# Patient Record
Sex: MALE | Race: WHITE | Employment: FULL TIME | URBAN - METROPOLITAN AREA
[De-identification: names, ages, dates, MRNs, and addresses within clinical notes are randomized per-mention and may not be internally consistent; named-entity substitution may affect disease eponyms.]

---

## 2022-02-13 ENCOUNTER — APPOINTMENT (OUTPATIENT)
Dept: GENERAL RADIOLOGY | Age: 57
DRG: 280 | End: 2022-02-13
Attending: EMERGENCY MEDICINE
Payer: COMMERCIAL

## 2022-02-13 ENCOUNTER — APPOINTMENT (OUTPATIENT)
Dept: CT IMAGING | Age: 57
DRG: 280 | End: 2022-02-13
Attending: EMERGENCY MEDICINE
Payer: COMMERCIAL

## 2022-02-13 ENCOUNTER — HOSPITAL ENCOUNTER (INPATIENT)
Age: 57
LOS: 5 days | Discharge: HOME | DRG: 280 | End: 2022-02-18
Attending: EMERGENCY MEDICINE | Admitting: HOSPITALIST
Payer: COMMERCIAL

## 2022-02-13 DIAGNOSIS — R09.02 HYPOXIA: ICD-10-CM

## 2022-02-13 DIAGNOSIS — I21.4 NON-ST ELEVATED MYOCARDIAL INFARCTION (HCC): Primary | ICD-10-CM

## 2022-02-13 DIAGNOSIS — I21.4 NSTEMI (NON-ST ELEVATED MYOCARDIAL INFARCTION) (HCC): ICD-10-CM

## 2022-02-13 PROBLEM — J90 PLEURAL EFFUSION: Status: ACTIVE | Noted: 2022-02-13

## 2022-02-13 PROBLEM — R07.9 CHEST PAIN: Status: ACTIVE | Noted: 2022-02-13

## 2022-02-13 LAB
ALBUMIN SERPL-MCNC: 3.4 G/DL (ref 3.4–5)
ALBUMIN/GLOB SERPL: 1.1 {RATIO} (ref 0.8–1.7)
ALP SERPL-CCNC: 131 U/L (ref 45–117)
ALT SERPL-CCNC: 157 U/L (ref 16–61)
ANION GAP SERPL CALC-SCNC: 8 MMOL/L (ref 3–18)
APTT PPP: 27.8 SEC (ref 23–36.4)
APTT PPP: 30.8 SEC (ref 23–36.4)
AST SERPL-CCNC: 65 U/L (ref 10–38)
ATRIAL RATE: 100 BPM
ATRIAL RATE: 82 BPM
BASOPHILS # BLD: 0.1 K/UL (ref 0–0.1)
BASOPHILS NFR BLD: 1 % (ref 0–2)
BILIRUB SERPL-MCNC: 0.4 MG/DL (ref 0.2–1)
BNP SERPL-MCNC: 2813 PG/ML (ref 0–900)
BUN SERPL-MCNC: 18 MG/DL (ref 7–18)
BUN/CREAT SERPL: 17 (ref 12–20)
CALCIUM SERPL-MCNC: 9.1 MG/DL (ref 8.5–10.1)
CALCULATED P AXIS, ECG09: 60 DEGREES
CALCULATED P AXIS, ECG09: 68 DEGREES
CALCULATED R AXIS, ECG10: -2 DEGREES
CALCULATED R AXIS, ECG10: 46 DEGREES
CALCULATED T AXIS, ECG11: -143 DEGREES
CALCULATED T AXIS, ECG11: 119 DEGREES
CHLORIDE SERPL-SCNC: 105 MMOL/L (ref 100–111)
CO2 SERPL-SCNC: 27 MMOL/L (ref 21–32)
COVID-19 RAPID TEST, COVR: DETECTED
COVID-19 RAPID TEST, COVR: NOT DETECTED
CREAT SERPL-MCNC: 1.08 MG/DL (ref 0.6–1.3)
D DIMER PPP FEU-MCNC: 0.75 UG/ML(FEU)
DIAGNOSIS, 93000: NORMAL
DIAGNOSIS, 93000: NORMAL
DIFFERENTIAL METHOD BLD: ABNORMAL
EOSINOPHIL # BLD: 0.1 K/UL (ref 0–0.4)
EOSINOPHIL NFR BLD: 1 % (ref 0–5)
ERYTHROCYTE [DISTWIDTH] IN BLOOD BY AUTOMATED COUNT: 14.4 % (ref 11.6–14.5)
GLOBULIN SER CALC-MCNC: 3.1 G/DL (ref 2–4)
GLUCOSE BLD STRIP.AUTO-MCNC: 180 MG/DL (ref 70–110)
GLUCOSE BLD STRIP.AUTO-MCNC: 308 MG/DL (ref 70–110)
GLUCOSE BLD STRIP.AUTO-MCNC: 93 MG/DL (ref 70–110)
GLUCOSE SERPL-MCNC: 387 MG/DL (ref 74–99)
HCT VFR BLD AUTO: 47.1 % (ref 36–48)
HGB BLD-MCNC: 14.9 G/DL (ref 13–16)
IMM GRANULOCYTES # BLD AUTO: 0.1 K/UL (ref 0–0.04)
IMM GRANULOCYTES NFR BLD AUTO: 1 % (ref 0–0.5)
INR PPP: 1 (ref 0.8–1.2)
LACTATE SERPL-SCNC: 1.2 MMOL/L (ref 0.4–2)
LACTATE SERPL-SCNC: 2.9 MMOL/L (ref 0.4–2)
LYMPHOCYTES # BLD: 1.6 K/UL (ref 0.9–3.6)
LYMPHOCYTES NFR BLD: 13 % (ref 21–52)
MCH RBC QN AUTO: 27.8 PG (ref 24–34)
MCHC RBC AUTO-ENTMCNC: 31.6 G/DL (ref 31–37)
MCV RBC AUTO: 87.9 FL (ref 78–100)
MONOCYTES # BLD: 0.8 K/UL (ref 0.05–1.2)
MONOCYTES NFR BLD: 7 % (ref 3–10)
NEUTS SEG # BLD: 9.4 K/UL (ref 1.8–8)
NEUTS SEG NFR BLD: 78 % (ref 40–73)
NRBC # BLD: 0 K/UL (ref 0–0.01)
NRBC BLD-RTO: 0 PER 100 WBC
P-R INTERVAL, ECG05: 134 MS
P-R INTERVAL, ECG05: 156 MS
PLATELET # BLD AUTO: 477 K/UL (ref 135–420)
PMV BLD AUTO: 10.5 FL (ref 9.2–11.8)
POTASSIUM SERPL-SCNC: 4.4 MMOL/L (ref 3.5–5.5)
PROT SERPL-MCNC: 6.5 G/DL (ref 6.4–8.2)
PROTHROMBIN TIME: 12.6 SEC (ref 11.5–15.2)
Q-T INTERVAL, ECG07: 338 MS
Q-T INTERVAL, ECG07: 386 MS
QRS DURATION, ECG06: 94 MS
QRS DURATION, ECG06: 98 MS
QTC CALCULATION (BEZET), ECG08: 436 MS
QTC CALCULATION (BEZET), ECG08: 450 MS
RBC # BLD AUTO: 5.36 M/UL (ref 4.35–5.65)
SODIUM SERPL-SCNC: 140 MMOL/L (ref 136–145)
SOURCE, COVRS: ABNORMAL
SOURCE, COVRS: NORMAL
TROPONIN-HIGH SENSITIVITY: 1396 NG/L (ref 0–78)
TROPONIN-HIGH SENSITIVITY: 1851 NG/L (ref 0–78)
TROPONIN-HIGH SENSITIVITY: 972 NG/L (ref 0–78)
VENTRICULAR RATE, ECG03: 100 BPM
VENTRICULAR RATE, ECG03: 82 BPM
WBC # BLD AUTO: 12 K/UL (ref 4.6–13.2)

## 2022-02-13 PROCEDURE — 74011250637 HC RX REV CODE- 250/637: Performed by: INTERNAL MEDICINE

## 2022-02-13 PROCEDURE — 85025 COMPLETE CBC W/AUTO DIFF WBC: CPT

## 2022-02-13 PROCEDURE — 99285 EMERGENCY DEPT VISIT HI MDM: CPT

## 2022-02-13 PROCEDURE — 80053 COMPREHEN METABOLIC PANEL: CPT

## 2022-02-13 PROCEDURE — 36415 COLL VENOUS BLD VENIPUNCTURE: CPT

## 2022-02-13 PROCEDURE — 96374 THER/PROPH/DIAG INJ IV PUSH: CPT

## 2022-02-13 PROCEDURE — 82962 GLUCOSE BLOOD TEST: CPT

## 2022-02-13 PROCEDURE — 74011250636 HC RX REV CODE- 250/636: Performed by: HOSPITALIST

## 2022-02-13 PROCEDURE — 85730 THROMBOPLASTIN TIME PARTIAL: CPT

## 2022-02-13 PROCEDURE — 84484 ASSAY OF TROPONIN QUANT: CPT

## 2022-02-13 PROCEDURE — 71275 CT ANGIOGRAPHY CHEST: CPT

## 2022-02-13 PROCEDURE — 83605 ASSAY OF LACTIC ACID: CPT

## 2022-02-13 PROCEDURE — 65660000000 HC RM CCU STEPDOWN

## 2022-02-13 PROCEDURE — 87040 BLOOD CULTURE FOR BACTERIA: CPT

## 2022-02-13 PROCEDURE — 74011250636 HC RX REV CODE- 250/636: Performed by: EMERGENCY MEDICINE

## 2022-02-13 PROCEDURE — 87635 SARS-COV-2 COVID-19 AMP PRB: CPT

## 2022-02-13 PROCEDURE — 71045 X-RAY EXAM CHEST 1 VIEW: CPT

## 2022-02-13 PROCEDURE — 74011000636 HC RX REV CODE- 636: Performed by: EMERGENCY MEDICINE

## 2022-02-13 PROCEDURE — 85610 PROTHROMBIN TIME: CPT

## 2022-02-13 PROCEDURE — 74011636637 HC RX REV CODE- 636/637: Performed by: HOSPITALIST

## 2022-02-13 PROCEDURE — 93005 ELECTROCARDIOGRAM TRACING: CPT

## 2022-02-13 PROCEDURE — 74011250637 HC RX REV CODE- 250/637: Performed by: EMERGENCY MEDICINE

## 2022-02-13 PROCEDURE — 85379 FIBRIN DEGRADATION QUANT: CPT

## 2022-02-13 PROCEDURE — 83880 ASSAY OF NATRIURETIC PEPTIDE: CPT

## 2022-02-13 RX ORDER — HEPARIN SODIUM 1000 [USP'U]/ML
4000 INJECTION, SOLUTION INTRAVENOUS; SUBCUTANEOUS ONCE
Status: COMPLETED | OUTPATIENT
Start: 2022-02-13 | End: 2022-02-13

## 2022-02-13 RX ORDER — INSULIN LISPRO 100 [IU]/ML
INJECTION, SOLUTION INTRAVENOUS; SUBCUTANEOUS
Status: DISCONTINUED | OUTPATIENT
Start: 2022-02-13 | End: 2022-02-13

## 2022-02-13 RX ORDER — HEPARIN SODIUM 5000 [USP'U]/ML
3000 INJECTION, SOLUTION INTRAVENOUS; SUBCUTANEOUS ONCE
Status: DISCONTINUED | OUTPATIENT
Start: 2022-02-13 | End: 2022-02-13

## 2022-02-13 RX ORDER — MAGNESIUM SULFATE 100 %
16 CRYSTALS MISCELLANEOUS AS NEEDED
Status: DISCONTINUED | OUTPATIENT
Start: 2022-02-13 | End: 2022-02-18 | Stop reason: HOSPADM

## 2022-02-13 RX ORDER — ATORVASTATIN CALCIUM 20 MG/1
40 TABLET, FILM COATED ORAL
Status: DISCONTINUED | OUTPATIENT
Start: 2022-02-13 | End: 2022-02-18 | Stop reason: HOSPADM

## 2022-02-13 RX ORDER — FUROSEMIDE 10 MG/ML
40 INJECTION INTRAMUSCULAR; INTRAVENOUS DAILY
Status: DISCONTINUED | OUTPATIENT
Start: 2022-02-13 | End: 2022-02-18

## 2022-02-13 RX ORDER — HEPARIN SODIUM 1000 [USP'U]/ML
3000 INJECTION, SOLUTION INTRAVENOUS; SUBCUTANEOUS ONCE
Status: COMPLETED | OUTPATIENT
Start: 2022-02-13 | End: 2022-02-13

## 2022-02-13 RX ORDER — INSULIN LISPRO 100 [IU]/ML
INJECTION, SOLUTION INTRAVENOUS; SUBCUTANEOUS
Status: DISCONTINUED | OUTPATIENT
Start: 2022-02-13 | End: 2022-02-18 | Stop reason: HOSPADM

## 2022-02-13 RX ORDER — ASPIRIN 81 MG/1
81 TABLET ORAL DAILY
Status: DISCONTINUED | OUTPATIENT
Start: 2022-02-14 | End: 2022-02-18 | Stop reason: HOSPADM

## 2022-02-13 RX ORDER — GUAIFENESIN 100 MG/5ML
162 LIQUID (ML) ORAL
Status: COMPLETED | OUTPATIENT
Start: 2022-02-13 | End: 2022-02-13

## 2022-02-13 RX ORDER — HEPARIN SODIUM 10000 [USP'U]/100ML
12-25 INJECTION, SOLUTION INTRAVENOUS
Status: DISCONTINUED | OUTPATIENT
Start: 2022-02-13 | End: 2022-02-13 | Stop reason: RX

## 2022-02-13 RX ORDER — INSULIN GLARGINE 100 [IU]/ML
20 INJECTION, SOLUTION SUBCUTANEOUS
Status: DISCONTINUED | OUTPATIENT
Start: 2022-02-13 | End: 2022-02-14

## 2022-02-13 RX ADMIN — FUROSEMIDE 40 MG: 10 INJECTION, SOLUTION INTRAMUSCULAR; INTRAVENOUS at 18:00

## 2022-02-13 RX ADMIN — INSULIN GLARGINE 20 UNITS: 100 INJECTION, SOLUTION SUBCUTANEOUS at 21:50

## 2022-02-13 RX ADMIN — IOPAMIDOL 100 ML: 755 INJECTION, SOLUTION INTRAVENOUS at 10:46

## 2022-02-13 RX ADMIN — HEPARIN SODIUM 4000 UNITS: 1000 INJECTION INTRAVENOUS; SUBCUTANEOUS at 11:30

## 2022-02-13 RX ADMIN — Medication 2 UNITS: at 21:49

## 2022-02-13 RX ADMIN — ASPIRIN 162 MG: 81 TABLET, CHEWABLE ORAL at 09:58

## 2022-02-13 RX ADMIN — HEPARIN SODIUM 3000 UNITS: 1000 INJECTION INTRAVENOUS; SUBCUTANEOUS at 20:41

## 2022-02-13 RX ADMIN — Medication 8 UNITS: at 14:00

## 2022-02-13 RX ADMIN — HEPARIN SODIUM 12 UNITS/KG/HR: 5000 INJECTION INTRAVENOUS; SUBCUTANEOUS at 11:31

## 2022-02-13 RX ADMIN — ATORVASTATIN CALCIUM 40 MG: 20 TABLET, FILM COATED ORAL at 21:51

## 2022-02-13 NOTE — Clinical Note
Sheath #1: Sheath: inserted. Sheath inserted/placed in the left radial  artery. Hemostasis achieved. Upon evaluation of the common femoral artery stick using fluoroscopy, the access site puncture was within the safe zone.

## 2022-02-13 NOTE — ED NOTES
21 R AC PIV established  20 R FA PIV established  Blood collected and sent to lab  EKG completed  Pt provided urinal and placed on 2 L NC for increased SOB

## 2022-02-13 NOTE — ED PROVIDER NOTES
EMERGENCY DEPARTMENT HISTORY AND PHYSICAL EXAM    Date: 2/13/2022  Patient Name: Khoi Gomez    History of Presenting Illness     Chief Complaint   Patient presents with    Shortness of Breath       History Provided By: Patient     History Nay Nath):   8:05 AM  Khoi Gomez is a 49 y.o. male with PMHX of Rom Tay who presents to the emergency department C/O dyspnea onset last night. Associated sxs include chest pain. Pt denies any other sxs or complaints. Pt tested positive for COVID on 31 Jan 2022, he is fully vaccinated and boosted. Chief Complaint: dyspnea  Onset: last night  Timing:  acute  Context: Symptoms started spontaneously, symptoms have rapidly worsened since onset  Location: lungs  Quality: Pressure and Tightness  Severity: Moderate  Modifying Factors: Nothing makes it better, or worse. Associated Symptoms: chest pain    PCP: Jim Ibrahim MD    Past History         Past Medical History:  No past medical history on file. Past Surgical History:  No past surgical history on file. Family History:  No family history on file. Reviewed and non-contributory    Social History:  Social History     Tobacco Use    Smoking status: Not on file    Smokeless tobacco: Not on file   Substance Use Topics    Alcohol use: Not on file    Drug use: Not on file       Allergies: Allergies   Allergen Reactions    Pcn [Penicillins] Unknown (comments)         Review of Systems      Review of Systems   Constitutional: Negative for chills and fever. HENT: Negative for rhinorrhea and sore throat. Eyes: Negative for pain and visual disturbance. Respiratory: Positive for shortness of breath. Negative for chest tightness and wheezing. Cardiovascular: Positive for chest pain. Negative for palpitations. Gastrointestinal: Negative for abdominal pain, diarrhea, nausea and vomiting. Musculoskeletal: Negative for arthralgias and myalgias. Skin: Negative for rash and wound. Neurological: Negative for speech difficulty, light-headedness and headaches. Psychiatric/Behavioral: Negative for agitation and confusion. All other systems reviewed and are negative. Physical Exam     Vitals:    02/13/22 0959 02/13/22 1027 02/13/22 1127 02/13/22 1156   BP: 134/77 132/74 130/79    Pulse: 82 73 76 74   Resp: 27 16 21 22   Temp:       SpO2: 93% 98% 97% 96%   Weight:       Height:           Physical Exam  Vitals and nursing note reviewed. Constitutional:       General: He is not in acute distress. Appearance: Normal appearance. He is normal weight. He is not ill-appearing. HENT:      Head: Normocephalic and atraumatic. Nose: Nose normal. No rhinorrhea. Mouth/Throat:      Mouth: Mucous membranes are moist.      Pharynx: No oropharyngeal exudate or posterior oropharyngeal erythema. Eyes:      Extraocular Movements: Extraocular movements intact. Conjunctiva/sclera: Conjunctivae normal.      Pupils: Pupils are equal, round, and reactive to light. Cardiovascular:      Rate and Rhythm: Regular rhythm. Tachycardia present. Heart sounds: No murmur heard. No friction rub. No gallop. Pulmonary:      Effort: Accessory muscle usage and respiratory distress present. Breath sounds: Normal breath sounds. No decreased air movement or transmitted upper airway sounds. No wheezing, rhonchi or rales. Abdominal:      General: Bowel sounds are normal.      Palpations: Abdomen is soft. Tenderness: There is no abdominal tenderness. There is no guarding or rebound. Musculoskeletal:         General: No swelling, tenderness or deformity. Normal range of motion. Cervical back: Normal range of motion and neck supple. No rigidity. Lymphadenopathy:      Cervical: No cervical adenopathy. Skin:     General: Skin is warm and dry. Findings: No rash. Neurological:      General: No focal deficit present.       Mental Status: He is alert and oriented to person, place, and time. Psychiatric:         Mood and Affect: Mood normal.         Behavior: Behavior normal.         Diagnostic Study Results     Labs -     Recent Results (from the past 12 hour(s))   EKG, 12 LEAD, INITIAL    Collection Time: 02/13/22  8:06 AM   Result Value Ref Range    Ventricular Rate 100 BPM    Atrial Rate 100 BPM    P-R Interval 134 ms    QRS Duration 98 ms    Q-T Interval 338 ms    QTC Calculation (Bezet) 436 ms    Calculated P Axis 68 degrees    Calculated R Axis 46 degrees    Calculated T Axis 119 degrees    Diagnosis       Normal sinus rhythm  Possible Left atrial enlargement  Minimal voltage criteria for LVH, may be normal variant ( [employer] product )  Nonspecific T wave abnormality  Abnormal ECG  No previous ECGs available     CBC WITH AUTOMATED DIFF    Collection Time: 02/13/22  8:15 AM   Result Value Ref Range    WBC 12.0 4.6 - 13.2 K/uL    RBC 5.36 4.35 - 5.65 M/uL    HGB 14.9 13.0 - 16.0 g/dL    HCT 47.1 36.0 - 48.0 %    MCV 87.9 78.0 - 100.0 FL    MCH 27.8 24.0 - 34.0 PG    MCHC 31.6 31.0 - 37.0 g/dL    RDW 14.4 11.6 - 14.5 %    PLATELET 832 (H) 875 - 420 K/uL    MPV 10.5 9.2 - 11.8 FL    NRBC 0.0 0  WBC    ABSOLUTE NRBC 0.00 0.00 - 0.01 K/uL    NEUTROPHILS 78 (H) 40 - 73 %    LYMPHOCYTES 13 (L) 21 - 52 %    MONOCYTES 7 3 - 10 %    EOSINOPHILS 1 0 - 5 %    BASOPHILS 1 0 - 2 %    IMMATURE GRANULOCYTES 1 (H) 0.0 - 0.5 %    ABS. NEUTROPHILS 9.4 (H) 1.8 - 8.0 K/UL    ABS. LYMPHOCYTES 1.6 0.9 - 3.6 K/UL    ABS. MONOCYTES 0.8 0.05 - 1.2 K/UL    ABS. EOSINOPHILS 0.1 0.0 - 0.4 K/UL    ABS. BASOPHILS 0.1 0.0 - 0.1 K/UL    ABS. IMM.  GRANS. 0.1 (H) 0.00 - 0.04 K/UL    DF AUTOMATED     METABOLIC PANEL, COMPREHENSIVE    Collection Time: 02/13/22  8:15 AM   Result Value Ref Range    Sodium 140 136 - 145 mmol/L    Potassium 4.4 3.5 - 5.5 mmol/L    Chloride 105 100 - 111 mmol/L    CO2 27 21 - 32 mmol/L    Anion gap 8 3.0 - 18 mmol/L    Glucose 387 (H) 74 - 99 mg/dL    BUN 18 7.0 - 18 MG/DL Creatinine 1.08 0.6 - 1.3 MG/DL    BUN/Creatinine ratio 17 12 - 20      GFR est AA >60 >60 ml/min/1.73m2    GFR est non-AA >60 >60 ml/min/1.73m2    Calcium 9.1 8.5 - 10.1 MG/DL    Bilirubin, total 0.4 0.2 - 1.0 MG/DL    ALT (SGPT) 157 (H) 16 - 61 U/L    AST (SGOT) 65 (H) 10 - 38 U/L    Alk. phosphatase 131 (H) 45 - 117 U/L    Protein, total 6.5 6.4 - 8.2 g/dL    Albumin 3.4 3.4 - 5.0 g/dL    Globulin 3.1 2.0 - 4.0 g/dL    A-G Ratio 1.1 0.8 - 1.7     TROPONIN-HIGH SENSITIVITY    Collection Time: 02/13/22  8:15 AM   Result Value Ref Range    Troponin-High Sensitivity 972 (HH) 0 - 78 ng/L   PROTHROMBIN TIME + INR    Collection Time: 02/13/22  8:15 AM   Result Value Ref Range    Prothrombin time 12.6 11.5 - 15.2 sec    INR 1.0 0.8 - 1.2     D DIMER    Collection Time: 02/13/22  8:15 AM   Result Value Ref Range    D DIMER 0.75 (H) <0.46 ug/ml(FEU)   PTT    Collection Time: 02/13/22  8:15 AM   Result Value Ref Range    aPTT 27.8 23.0 - 36.4 SEC   LACTIC ACID    Collection Time: 02/13/22  8:30 AM   Result Value Ref Range    Lactic acid 2.9 (HH) 0.4 - 2.0 MMOL/L   EKG, 12 LEAD, SUBSEQUENT    Collection Time: 02/13/22  9:56 AM   Result Value Ref Range    Ventricular Rate 82 BPM    Atrial Rate 82 BPM    P-R Interval 156 ms    QRS Duration 94 ms    Q-T Interval 386 ms    QTC Calculation (Bezet) 450 ms    Calculated P Axis 60 degrees    Calculated R Axis -2 degrees    Calculated T Axis -143 degrees    Diagnosis       Normal sinus rhythm  T wave abnormality, consider lateral ischemia  Abnormal ECG  When compared with ECG of 13-FEB-2022 08:06,  No significant change was found     COVID-19 RAPID TEST    Collection Time: 02/13/22 10:25 AM   Result Value Ref Range    Specimen source Nasopharyngeal      COVID-19 rapid test Not detected NOTD         Radiologic Studies -   CTA CHEST W OR W WO CONT   Final Result      1. No pulmonary thromboembolic disease. 2. Moderate to large right and moderate left pleural effusions.  Additional regions of patchy groundglass opacity are also seen throughout both lower lobe   basilar segments and throughout both perihilar upper lobes. Indeterminant to   what extent the regions of groundglass opacity representing alveolitis related   to moderate central airway disease or instead represent an acute atypical   infection such as acute Covid-19 pneumonia. XR CHEST PORT   Final Result      Mild diffusely increased interstitial markings and scattered patchy alveolar   opacities, appearance most suggestive of an acute atypical multifocal pulmonary   infiltrate. CT Results  (Last 48 hours)               02/13/22 1056  CTA CHEST W OR W WO CONT Final result    Impression:      1. No pulmonary thromboembolic disease. 2. Moderate to large right and moderate left pleural effusions. Additional   regions of patchy groundglass opacity are also seen throughout both lower lobe   basilar segments and throughout both perihilar upper lobes. Indeterminant to   what extent the regions of groundglass opacity representing alveolitis related   to moderate central airway disease or instead represent an acute atypical   infection such as acute Covid-19 pneumonia. Narrative:  EXAM: CTA CHEST W OR W WO CONT       CLINICAL INDICATION/HISTORY: Shortness of breath, elevated d-dimer       COMPARISON: Chest radiograph same day       TECHNIQUE: Thin section CT was performed through the chest during pulmonary   arterial enhancement. MIP 3D reconstructions were generated to increase   sensitivity in the detection of pulmonary emboli. One or more dose reduction   techniques were used on this CT: automated exposure control, adjustment of the   mAs and/or kVp according to patient size, and iterative reconstruction   techniques.   The specific techniques used on this CT exam have been documented in the patient's electronic medical record. Digital Imaging and Communications   in Medicine (DICOM) format image data are available to nonaffiliated external   healthcare facilities or entities on a secure, media free, reciprocally   searchable basis with patient authorization for at least a 12-month period after   this study. --- EXAM QUALITY ---       1. Overall exam quality- satisfactory: adequate    2. Adequacy of pulmonary enhancement-optimal: more than enough enhancement to   evaluate subsegmental vessels. Determined by main PA density 250 HU are greater   3. Adequacy of breath hold- adequate: sufficient enough to render diagnosis    4. There are no significant noise, beam hardening, streak artifacts affecting   scan quality. _______________       FINDINGS:       ---  PULMONARY CT ANGIOGRAM  ---       PULMONARY VASCULATURE: The right and left central, primary segmental and   subsegmental pulmonary arteries appear patent. There is no convincing evidence   of intraluminal filling defect identified to suggest pulmonary embolism. THORACIC AORTA: Normal caliber thoracic aorta. No aortic aneurysm, intramural   hematoma or dissection. ---  CT CHEST ---       LUNG PARENCHYMA:   Patchy groundglass opacities are seen throughout medial and   perihilar distributions of both upper lobes. There is also patchy opacity   relatively diffusely throughout the basilar segments of both lower lobes. Moderate central and lower lobe bronchial wall thickening. No bronchiectasis. PLEURAL SPACES:   Moderate to large right and moderate-sized left pleural   effusions. No pneumothorax. MEDIASTINUM:   No thoracic lymphadenopathy. No global cardiomegaly. No   pericardial effusion. OSSEOUS STRUCTURES:   No acute osseous abnormality. Mild thoracic degenerative   disk disease. UPPER ABDOMEN: No acute abnormality.            _______________               CXR Results  (Last 50 hours)               02/13/22 0843  XR CHEST PORT Final result    Impression:      Mild diffusely increased interstitial markings and scattered patchy alveolar   opacities, appearance most suggestive of an acute atypical multifocal pulmonary   infiltrate. Narrative:  EXAM: CHEST RADIOGRAPH, SINGLE VIEW       CLINICAL INDICATION/HISTORY: Dyspnea, shortness of breath       COMPARISON: None. TECHNIQUE: Portable frontal view of the chest was obtained.        _______________       FINDINGS:       SUPPORT DEVICES: None. HEART AND MEDIASTINUM: Cardiomediastinal silhouette appears within normal   limits. Normal caliber thoracic aorta. No central vascular congestion. LUNGS AND PLEURAL SPACES: Mild diffusely increased interstitial markings. Scattered patchy parenchymal opacities are also seen throughout bilateral   perihilar and medial right basilar distributions. No definite pleural effusion. No pneumothorax. BONY THORAX AND SOFT TISSUES: No acute osseous abnormality. _______________                 Medications given in the ED-  Medications   heparin 25,000 units in  ml infusion (12 Units/kg/hr × 79.4 kg IntraVENous New Bag 2/13/22 1131)   aspirin chewable tablet 162 mg (162 mg Oral Given 2/13/22 0958)   iopamidoL (ISOVUE-370) 76 % injection 100 mL (100 mL IntraVENous Given 2/13/22 1046)   heparin (porcine) 1,000 unit/mL injection 4,000 Units (4,000 Units IntraVENous Given 2/13/22 1130)         Procedures     Procedures    ED Course     I am the first provider for this patient. I reviewed the vital signs, available nursing notes, past medical history, past surgical history, family history and social history. Records Reviewed: Nursing Notes    Pulse Oximetry Analysis - 95% on RA     Cardiac Monitor:  Rate: 96 bpm  Rhythm: sinus rhythm    EKG interpretation: (Preliminary)  Rhythm: NSR.  Rate: 100 bpm; no STEMI  EKG read by Constance Beckett MD at 8:06 AM    EKG interpretation: (Repeat)  Rhythm: NSR. Rate: 82 bpm; no STEMI, inverted T V2-3, aVf, V5-6  EKG read by Behzad Coleman MD at 9:56 AM     EKG interpretation: (Repeat)  Rhythm: NSR. Rate: 81 bpm; no STEMI  EKG read by Behzad Coleman MD at 10:12 AM     8:05 AM Initial assessment performed. The patients presenting problems have been discussed, and they are in agreement with the care plan formulated and outlined with them. I have encouraged them to ask questions as they arise throughout their visit. ED Course as of 02/13/22 1202   Sun Feb 13, 2022   1017 Discussed with Dr. Kun Guevara who agrees with plan to CT patient. He also agrees with heparin at this time. [JM]   1200 Discussed with Dr. Mo Dixon for telemetry admission. [JM]      ED Course User Index  [JM] Favian Canales MD           Medical Decision Making     Provider Notes (Medical Decision Making):   DDX: Pneumonia, post Covid, COVID long-haul, PE, ACS, URI, Covid pneumonia    Discussion:  49 y.o. male with recent coronavirus infection. Today he comes in with shortness of breath for 1 day and chest pain. He has recently curtailed the smoking due to his coronavirus infection. Patient states he does not go to the doctor very often. D-dimer and troponin were both elevated. PET scan did not demonstrate any acute PE but showed what appears to be likely sequela of coronavirus infection 2 weeks ago. Patient is requiring some oxygen support. He has been started on heparin for ACS. Discussed with cardiology and hospitalist for telemetry admission. Critical Care Time:   I have spent 62 minutes of critical care time involved in lab review, consultations with specialist, family decision-making, and documentation. During this entire length of time I was immediately available to the patient. Critical Care:   The reason for providing this level of medical care for this critically ill patient was due a critical illness that impaired one or more vital organ systems such that there was a high probability of imminent or life threatening deterioration in the patients condition. This care involved high complexity decision making to assess, manipulate, and support vital system functions, to treat this degreee vital organ system failure and to prevent further life threatening deterioration of the patients condition. Diagnosis and Disposition     Critical Care Time: 62 minutes    Core Measures:  For Hospitalized Patients:    1. Hospitalization Decision Time:  The decision to hospitalize the patient was made by Jami Youssef MD, MD at 9:38 AM on 2/13/2022    2. Aspirin: Aspirin was given    12:00 PM  Patient is being admitted to the hospital by Dr. Jesusita Hall. The results of their tests and reasons for their admission have been discussed with them and/or available family. They convey agreement and understanding for the need to be admitted and for their admission diagnosis. CONDITIONS ON ADMISSION:  Sepsis is not present at the time of admission. Deep Vein Thrombosis is not present at the time of admission. Thrombosis is not present at the time of admission. Urinary Tract Infection is not present at the time of admission. Pneumonia is not present at the time of admission. MRSA is not present at the time of admission. Wound infection is not present at the time of admission. Pressure Ulcer is not present at the time of admission. CLINICAL IMPRESSION:    1. Non-ST elevated myocardial infarction ([address]    2. Hypoxia          Dragon Disclaimer     Please note that this dictation was completed with [employer], the computer voice recognition software. Quite often unanticipated grammatical, syntax, homophones, and other interpretive errors are inadvertently transcribed by the computer software. Please disregard these errors. Please excuse any errors that have escaped final proofreading.     Norberto Schafer MD

## 2022-02-13 NOTE — H&P
History & Physical    Patient: Khris Miller MRN: [id number]  CSN: [id number]    YOB: 1965  Age: 49 y.o. Sex: male      DOA: 2/13/2022  Primary Care Provider:  Vania Freeman MD      Assessment/Plan     Patient Active Problem List   Diagnosis Code    Hypoxia R09.02    Non-STEMI (non-ST elevated myocardial infarction) ([facility] I21.4    DM (diabetes mellitus) ([facility] E11.9    Pleural effusion J90    Chest pain R07.9       Admit to monitored floor    Shortness of breath-  Likely secondary to CHF, pleural effusion  Started on diuresis  Follow-up chest x-ray. CHF-  Follow echo, will check NT proBNP. Pleural effusion-  Likely secondary to CHF  Diuresis as above    NSTEMI-  On heparin drip  Further testing per cardiology. DM-  Uncontrolled  Low-dose Lantus  Sliding-scale insulin  Diabetic diet      Estimated length of stay : 2-3 days    CC: Chest pain, shortness of breath       HPI:     Khris Milelr is a 49 y.o. male with past medical history of diabetes presents to ER with concerns of chest pain and shortness of breath. Patient reports that he was diagnosed with COVID-19 on January 31, 2022. He is vaccinated including booster. He had mild symptoms, he quarantine for 5 days and return to work. He has been feeling better for the past 1 week. He has been doing fine until yesterday. He went out and worked in the yard yesterday morning. Last night when he went to sleep he had some shortness of breath, chest pain and cough. He took some Mucinex that helped however he had to wake up in the middle of the night to sit up. This morning he continued to have chest pain and felt more short of breath when he decided to present to ER. Chest pain located in the center of the chest dull throbbing pain. No radiation. Not associated with nausea or diaphoresis. Long history of smoking. He smokes about a pack a day.   No history of any heart disease in the past.  In ER he was noted to have elevated high-sensitivity troponin. CTA chest with no PE however showed moderate to large right and moderate left pleural effusion. Additional region of patchy groundglass opacity seen throughout both lower lobe basilar segments and throughout both perihilar upper lobes. His EKG showed normal sinus rhythm, T wave abnormality in lateral leads. Past Medical History:   Diagnosis Date    DM (diabetes mellitus) ([address]        No past surgical history on file. No family history on file. Social History     Socioeconomic History    Marital status:        Prior to Admission medications    Not on File       Allergies   Allergen Reactions    Pcn [Penicillins] Unknown (comments)       Review of Systems  Gen: No fever, chills, malaise, weight loss/gain. Heent: No headache, rhinorrhea, epistaxis, ear pain, hearing loss, sinus pain, neck pain/stiffness, sore throat. Heart: see above  Resp: see above. GI: No nausea, vomiting, diarrhea, constipation, melena or hematochezia. : No urinary obstruction, dysuria or hematuria. Derm: No rash, new skin lesion or pruritis. Musc/skeletal: no bone or joint complains. Vasc: No edema, cyanosis or claudication. Endo: No heat/cold intolerance, no polyuria,polydipsia or polyphagia. Neuro: No unilateral weakness, numbness, tingling. No seizures. Heme: No easy bruising or bleeding. Physical Exam:     Physical Exam:  Visit Vitals  /77   Pulse 74   Temp 97.7 °F (36.5 °C)   Resp 20   Ht 5' 8\" (1.727 m)   Wt 79.4 kg (175 lb)   SpO2 99%   BMI 26.61 kg/m²    O2 Flow Rate (L/min): 2 l/min O2 Device: Nasal cannula    Temp (24hrs), Av.8 °F (36.6 °C), Min:97.7 °F (36.5 °C), Max:97.8 °F (36.6 °C)    701 -  1900  In: 240 [P.O.:240]  Out: -    No intake/output data recorded. General:  Awake, cooperative, no distress. Head:  Normocephalic, without obvious abnormality, atraumatic.    Eyes:  Conjunctivae/corneas clear, sclera anicteric, PERRL, EOMs intact. Nose: Nares normal. No drainage or sinus tenderness. Throat: Lips, mucosa, and tongue normal.    Neck: Supple, symmetrical, trachea midline, no adenopathy. Lungs:   Decreased breath sounds lower lungs bilaterally. Heart:   S1, S2, no murmur, click, rub or gallop. Abdomen: Soft, non-tender. Bowel sounds normal. No masses,  No organomegaly. Extremities: Extremities normal, atraumatic, no cyanosis. trace edema. Capillary refill normal.   Pulses: 2+ and symmetric all extremities. Skin: Skin color pink, turgor normal. No rashes or lesions   Neurologic: CNII-XII intact. No focal motor or sensory deficit. Labs Reviewed:    CMP:   Lab Results   Component Value Date/Time     02/13/2022 08:15 AM    K 4.4 02/13/2022 08:15 AM     02/13/2022 08:15 AM    CO2 27 02/13/2022 08:15 AM    AGAP 8 02/13/2022 08:15 AM     (H) 02/13/2022 08:15 AM    BUN 18 02/13/2022 08:15 AM    CREA 1.08 02/13/2022 08:15 AM    GFRAA >60 02/13/2022 08:15 AM    GFRNA >60 02/13/2022 08:15 AM    CA 9.1 02/13/2022 08:15 AM    ALB 3.4 02/13/2022 08:15 AM    TP 6.5 02/13/2022 08:15 AM    GLOB 3.1 02/13/2022 08:15 AM    AGRAT 1.1 02/13/2022 08:15 AM     (H) 02/13/2022 08:15 AM     CBC:   Lab Results   Component Value Date/Time    WBC 12.0 02/13/2022 08:15 AM    HGB 14.9 02/13/2022 08:15 AM    HCT 47.1 02/13/2022 08:15 AM     (H) 02/13/2022 08:15 AM     All Cardiac Markers in the last 24 hours: No results found for: CPK, CK, CKMMB, CKMB, RCK3, CKMBT, CKNDX, CKND1, PETTY, TROPT, TROIQ, CAMRYN, TROPT, TNIPOC, BNP, BNPP      Procedures/imaging: see electronic medical records for all procedures/Xrays and details which were not copied into this note but were reviewed prior to creation of Plan    Please note that this dictation was completed with [employer], the [employer] voice recognition software.   Quite often unanticipated grammatical, syntax, homophones, and other interpretive errors are inadvertently transcribed by the computer software. Please disregard these errors. Please excuse any errors that have escaped final proofreading.         CC: Johanny Ulrich MD

## 2022-02-13 NOTE — Clinical Note
Contrast Dose Calculator:   Patient's age: 49.   Patient's sex: Male. Patient weight (kg) = 79.8. Creatinine level (mg/dL) = 0.65. Creatinine clearance (mL/min): 146.64. Max Contrast dose per Creatinine Cl calculator = 329.94 mL.

## 2022-02-13 NOTE — CONSULTS
[facility]      Patient: Kathy Castellon MRN: [id number]  SSN: [id number]    YOB: 1965  Age: 49 y.o. Sex: male    Date of Consultation: 02/13/2022  Referring Physician: Dorita Massey MD  Reason for Consultation: Chest pain, abnormal troponin     Chief complain:  Chest pain, shortness of breath    HPI:  53-year-old gentleman came to emergency with complaining of chest pain and shortness of breath. He was diagnosed with COVID-19 on January 31, 2022. He had mild symptoms like cough and congestion. He quit in for 5 days and return to work. For last few days he has been feeling leg swelling, shortness of breath chest pain and cough. He is taking Mucinex with some improvement. He is complaining of worsening of shortness of breath and orthopnea. He is complaining of leg swelling. He is complaining of midsternal dull discomfort with movement and with cough. Chest discomfort improves by rest.  Chest pain is non radiating. He denies any dizziness, palpitation, presyncope or syncope. Denies any fever. He is current smoker. He has family history of premature coronary artery disease. Mother had cardiac event in her 46s. Cardiology consult called in view of chest pain, shortness of breath and abnormal troponin    Past Medical History:   Diagnosis Date    DM (diabetes mellitus) ([facility] [address]      No past surgical history on file. Current Facility-Administered Medications   Medication Dose Route Frequency    heparin 25,000 units in  ml infusion  12-25 Units/kg/hr IntraVENous TITRATE    glucagon (GLUCAGEN) injection 1 mg  1 mg IntraMUSCular PRN    glucose chewable tablet 16 g  16 g Oral PRN    insulin lispro (HUMALOG) injection   SubCUTAneous AC&HS    furosemide (LASIX) injection 40 mg  40 mg IntraVENous DAILY    insulin glargine (LANTUS) injection 20 Units  20 Units SubCUTAneous QHS       Allergies and Intolerances:    Allergies   Allergen Reactions    Pcn [Penicillins] Unknown (comments)       Family History:   No family history on file. Social History:   He  has no history on file for tobacco use. He  has no history on file for alcohol use. Review of Systems:     Gen: No fever, chills, malaise, weight loss/gain. Heent: No headache, rhinorrhea, epistaxis, ear pain, hearing loss, sinus pain, neck pain/stiffness, sore throat. Heart:   positive chest pain, shortness of breath, orthopnea, no palpitations,pnd. Resp:  positive cough,  No hemoptysis, wheezing and  Dyspnea  GI: No nausea, vomiting, diarrhea, constipation, melena or hematochezia. : No urinary obstruction, dysuria or hematuria. Derm: No rash, new skin lesion or pruritis. Musc/skeletal: no bone or joint complains. Vasc:  positive edema, no cyanosis or claudication. Endo: No heat/cold intolerance, no polyuria,polydipsia or polyphagia. Neuro: No unilateral weakness, numbness, tingling. No seizures. Heme: No easy bruising or bleeding. Physical:   Patient Vitals for the past 6 hrs:   Temp Pulse Resp BP SpO2   02/13/22 1700 97.7 °F (36.5 °C) 74 20 121/77 99 %   02/13/22 1233  83 23 (!) 141/80 96 %         Exam:   General Appearance: Comfortable, not using accessory muscles of respiration. HEENT: EKATERINA. HEAD: Atraumatic  NECK: No JVD, no thyroidomeglay. CAROTIDS: no bruit  LUNGS:  Absent air entry right and left base, reduced air entry right mid zone, no crepitation   HEART: S1+S2 audible, no murmur, no pericardial rub. ABD: Non-tender, BS Audible    EXT: Bilateral lower extremity + edema, and no cyanosis. VASCULAR EXAM: Pulses are intact. PSYCHIATRIC EXAM: Mood is appropriate. MUSCULOSKELETAL: Grossly no joint deformity.   NEUROLOGICAL: AAO times 3, Motor and sensory sytem intact     Review of Data:   LABS:   Lab Results   Component Value Date/Time    WBC 12.0 02/13/2022 08:15 AM    HGB 14.9 02/13/2022 08:15 AM    HCT 47.1 02/13/2022 08:15 AM    PLATELET 745 (H) 65/25/6229 08:15 AM     Lab Results   Component Value Date/Time    Sodium 140 02/13/2022 08:15 AM    Potassium 4.4 02/13/2022 08:15 AM    Chloride 105 02/13/2022 08:15 AM    CO2 27 02/13/2022 08:15 AM    Glucose 387 (H) 02/13/2022 08:15 AM    BUN 18 02/13/2022 08:15 AM    Creatinine 1.08 02/13/2022 08:15 AM     No results found for: CHOL, CHOLX, CHLST, CHOLV, HDL, HDLP, LDL, LDLC, DLDLP, TGLX, TRIGL, TRIGP  No components found for: GPT  No results found for: HBA1C, TCL8RGPU, MVQ2NRHM      Cardiology Procedures:   Results for orders placed or performed during the hospital encounter of 02/13/22   EKG, 12 LEAD, INITIAL   Result Value Ref Range    Ventricular Rate 100 BPM    Atrial Rate 100 BPM    P-R Interval 134 ms    QRS Duration 98 ms    Q-T Interval 338 ms    QTC Calculation (Bezet) 436 ms    Calculated P Axis 68 degrees    Calculated R Axis 46 degrees    Calculated T Axis 119 degrees    Diagnosis       Normal sinus rhythm   Poor R Wave Progression   T wave abnormality, consider lateral ischemia   Abnormal ECG               Impression / Plan:    Patient Active Problem List   Diagnosis Code    Hypoxia R09.02    Non-STEMI (non-ST elevated myocardial infarction) ([facility]) I21.4    DM (diabetes mellitus) ([facility]) E11.9    Pleural effusion J90    Chest pain R07.9       CT chest reported    1. No pulmonary thromboembolic disease. 2. Moderate to large right and moderate left pleural effusions. Additional regions of patchy groundglass opacity are also seen throughout both lower lobe basilar segments and throughout both perihilar upper lobes. Indeterminant to what extent the regions of groundglass opacity representing alveolitis related to moderate central airway disease or instead represent an acute atypical  infection such as acute Covid-19 pneumonia. Start aspirin 81 mg by mouth once a day and atorvastatin 40 mg by mouth at bedtime. Start IV Lasix  She will EKG with cardiac enzymes Q 6-8 hours x3. IV heparin drip.     Will add beta-blocker if blood pressure permits. Echocardiogram.     Plan discussed with patient and family member at bedside and they verbally understood and agreed.     Continue telemetry monitor  Further cardiac workup as clinically indicated       Signed By: Iwona Cantrell MD     February 13, 2022

## 2022-02-13 NOTE — ED TRIAGE NOTES
Patient ambulatory to ED COVID + with c/o SOB that started last night and chest pain.      Tripod position after ambulation from triage to ED bed    Able to speak in full sentences, visible WOB

## 2022-02-13 NOTE — Clinical Note
History and physical documented and up to date, allergies reviewed, lab results reviewed, pre-procedure education provided, patient verbalized understanding of procedure, procedural consent signed and patient is NPO. 18

## 2022-02-13 NOTE — Clinical Note
TRANSFER - IN REPORT:     Verbal report received from: EDGAR RN. Report consisted of patient's Situation, Background, Assessment and   Recommendations(SBAR). Opportunity for questions and clarification was provided. Assessment completed upon patient's arrival to unit and care assumed. Patient transported with a Registered Nurse.

## 2022-02-14 ENCOUNTER — APPOINTMENT (OUTPATIENT)
Dept: GENERAL RADIOLOGY | Age: 57
DRG: 280 | End: 2022-02-14
Attending: HOSPITALIST
Payer: COMMERCIAL

## 2022-02-14 ENCOUNTER — APPOINTMENT (OUTPATIENT)
Dept: NON INVASIVE DIAGNOSTICS | Age: 57
DRG: 280 | End: 2022-02-14
Attending: INTERNAL MEDICINE
Payer: COMMERCIAL

## 2022-02-14 ENCOUNTER — APPOINTMENT (OUTPATIENT)
Dept: GENERAL RADIOLOGY | Age: 57
DRG: 280 | End: 2022-02-14
Attending: INTERNAL MEDICINE
Payer: COMMERCIAL

## 2022-02-14 LAB
ALBUMIN SERPL-MCNC: 2.8 G/DL (ref 3.4–5)
ALBUMIN/GLOB SERPL: 0.9 {RATIO} (ref 0.8–1.7)
ALP SERPL-CCNC: 95 U/L (ref 45–117)
ALT SERPL-CCNC: 145 U/L (ref 16–61)
ANION GAP SERPL CALC-SCNC: 7 MMOL/L (ref 3–18)
APTT PPP: 32.3 SEC (ref 23–36.4)
APTT PPP: 40.4 SEC (ref 23–36.4)
APTT PPP: 57.4 SEC (ref 23–36.4)
AST SERPL-CCNC: 72 U/L (ref 10–38)
ATRIAL RATE: 81 BPM
BASOPHILS # BLD: 0.1 K/UL (ref 0–0.1)
BASOPHILS NFR BLD: 1 % (ref 0–2)
BILIRUB SERPL-MCNC: 0.3 MG/DL (ref 0.2–1)
BUN SERPL-MCNC: 16 MG/DL (ref 7–18)
BUN/CREAT SERPL: 21 (ref 12–20)
CALCIUM SERPL-MCNC: 8.7 MG/DL (ref 8.5–10.1)
CALCULATED P AXIS, ECG09: 65 DEGREES
CALCULATED R AXIS, ECG10: -25 DEGREES
CALCULATED T AXIS, ECG11: 155 DEGREES
CHLORIDE SERPL-SCNC: 109 MMOL/L (ref 100–111)
CO2 SERPL-SCNC: 28 MMOL/L (ref 21–32)
CREAT SERPL-MCNC: 0.75 MG/DL (ref 0.6–1.3)
DIAGNOSIS, 93000: NORMAL
DIFFERENTIAL METHOD BLD: NORMAL
ECHO AO ROOT DIAM: 3.3 CM
ECHO AO ROOT INDEX: 1.71 CM/M2
ECHO AV AREA PEAK VELOCITY: 1.4 CM2
ECHO AV AREA PEAK VELOCITY: 1.4 CM2
ECHO AV AREA VTI: 1.2 CM2
ECHO AV AREA VTI: 1.2 CM2
ECHO AV MEAN GRADIENT: 5 MMHG
ECHO AV MEAN VELOCITY: 1.1 M/S
ECHO AV PEAK GRADIENT: 9 MMHG
ECHO AV PEAK VELOCITY: 1.5 M/S
ECHO AV VELOCITY RATIO: 0.47
ECHO AV VTI: 32.2 CM
ECHO EST RA PRESSURE: 3 MMHG
ECHO LA DIAMETER INDEX: 2.33 CM/M2
ECHO LA DIAMETER: 4.5 CM
ECHO LA TO AORTIC ROOT RATIO: 1.36
ECHO LA VOL 4C: 53 ML (ref 18–58)
ECHO LA VOLUME INDEX A4C: 27 ML/M2 (ref 16–34)
ECHO LV E' LATERAL VELOCITY: 6 CM/S
ECHO LV E' SEPTAL VELOCITY: 5 CM/S
ECHO LV EDV A2C: 119 ML
ECHO LV EDV A4C: 77 ML
ECHO LV EDV BP: 97 ML (ref 67–155)
ECHO LV EDV INDEX A4C: 40 ML/M2
ECHO LV EDV INDEX BP: 50 ML/M2
ECHO LV EDV NDEX A2C: 62 ML/M2
ECHO LV EJECTION FRACTION A2C: 41 %
ECHO LV EJECTION FRACTION A4C: 45 %
ECHO LV EJECTION FRACTION BIPLANE: 43 % (ref 55–100)
ECHO LV ESV A2C: 71 ML
ECHO LV ESV A4C: 42 ML
ECHO LV ESV BP: 55 ML (ref 22–58)
ECHO LV ESV INDEX A2C: 37 ML/M2
ECHO LV ESV INDEX A4C: 22 ML/M2
ECHO LV ESV INDEX BP: 28 ML/M2
ECHO LV FRACTIONAL SHORTENING: 25 % (ref 28–44)
ECHO LV GLOBAL LONGITUDINAL STRAIN (GLS): -8.1 %
ECHO LV INTERNAL DIMENSION DIASTOLE INDEX: 2.85 CM/M2
ECHO LV INTERNAL DIMENSION DIASTOLIC: 5.5 CM (ref 4.2–5.9)
ECHO LV INTERNAL DIMENSION SYSTOLIC INDEX: 2.12 CM/M2
ECHO LV INTERNAL DIMENSION SYSTOLIC: 4.1 CM
ECHO LV IVSD: 1.1 CM (ref 0.6–1)
ECHO LV MASS 2D: 242 G (ref 88–224)
ECHO LV MASS INDEX 2D: 125.4 G/M2 (ref 49–115)
ECHO LV POSTERIOR WALL DIASTOLIC: 1.1 CM (ref 0.6–1)
ECHO LV RELATIVE WALL THICKNESS RATIO: 0.4
ECHO LVOT AREA: 3.1 CM2
ECHO LVOT AV VTI INDEX: 0.4
ECHO LVOT DIAM: 2 CM
ECHO LVOT MEAN GRADIENT: 1 MMHG
ECHO LVOT PEAK GRADIENT: 2 MMHG
ECHO LVOT PEAK VELOCITY: 0.7 M/S
ECHO LVOT STROKE VOLUME INDEX: 21.2 ML/M2
ECHO LVOT SV: 40.8 ML
ECHO LVOT VTI: 13 CM
ECHO MV A VELOCITY: 0.78 M/S
ECHO MV E DECELERATION TIME (DT): 132.8 MS
ECHO MV E VELOCITY: 1.06 M/S
ECHO MV E/A RATIO: 1.36
ECHO MV E/E' LATERAL: 17.67
ECHO MV E/E' RATIO (AVERAGED): 19.43
ECHO MV E/E' SEPTAL: 21.2
ECHO RIGHT VENTRICULAR SYSTOLIC PRESSURE (RVSP): 44 MMHG
ECHO RV FREE WALL PEAK S': 17 CM/S
ECHO RV INTERNAL DIMENSION: 3.3 CM
ECHO RV TAPSE: 1.7 CM (ref 1.5–2)
ECHO TV REGURGITANT MAX VELOCITY: 3.22 M/S
ECHO TV REGURGITANT PEAK GRADIENT: 41 MMHG
EOSINOPHIL # BLD: 0.1 K/UL (ref 0–0.4)
EOSINOPHIL NFR BLD: 1 % (ref 0–5)
ERYTHROCYTE [DISTWIDTH] IN BLOOD BY AUTOMATED COUNT: 14.3 % (ref 11.6–14.5)
EST. AVERAGE GLUCOSE BLD GHB EST-MCNC: 280 MG/DL
GLOBULIN SER CALC-MCNC: 3 G/DL (ref 2–4)
GLUCOSE BLD STRIP.AUTO-MCNC: 101 MG/DL (ref 70–110)
GLUCOSE BLD STRIP.AUTO-MCNC: 132 MG/DL (ref 70–110)
GLUCOSE BLD STRIP.AUTO-MCNC: 256 MG/DL (ref 70–110)
GLUCOSE BLD STRIP.AUTO-MCNC: 291 MG/DL (ref 70–110)
GLUCOSE SERPL-MCNC: 101 MG/DL (ref 74–99)
HBA1C MFR BLD: 11.4 % (ref 4.2–5.6)
HCT VFR BLD AUTO: 41.1 % (ref 36–48)
HGB BLD-MCNC: 13.4 G/DL (ref 13–16)
IMM GRANULOCYTES # BLD AUTO: 0 K/UL (ref 0–0.04)
IMM GRANULOCYTES NFR BLD AUTO: 0 % (ref 0–0.5)
LYMPHOCYTES # BLD: 2.4 K/UL (ref 0.9–3.6)
LYMPHOCYTES NFR BLD: 23 % (ref 21–52)
MCH RBC QN AUTO: 28.1 PG (ref 24–34)
MCHC RBC AUTO-ENTMCNC: 32.6 G/DL (ref 31–37)
MCV RBC AUTO: 86.2 FL (ref 78–100)
MONOCYTES # BLD: 0.8 K/UL (ref 0.05–1.2)
MONOCYTES NFR BLD: 8 % (ref 3–10)
NEUTS SEG # BLD: 6.9 K/UL (ref 1.8–8)
NEUTS SEG NFR BLD: 67 % (ref 40–73)
NRBC # BLD: 0 K/UL (ref 0–0.01)
NRBC BLD-RTO: 0 PER 100 WBC
P-R INTERVAL, ECG05: 160 MS
PLATELET # BLD AUTO: 406 K/UL (ref 135–420)
PMV BLD AUTO: 10.6 FL (ref 9.2–11.8)
POTASSIUM SERPL-SCNC: 3.3 MMOL/L (ref 3.5–5.5)
PROT SERPL-MCNC: 5.8 G/DL (ref 6.4–8.2)
Q-T INTERVAL, ECG07: 398 MS
QRS DURATION, ECG06: 94 MS
QTC CALCULATION (BEZET), ECG08: 462 MS
RBC # BLD AUTO: 4.77 M/UL (ref 4.35–5.65)
SODIUM SERPL-SCNC: 144 MMOL/L (ref 136–145)
VENTRICULAR RATE, ECG03: 81 BPM
WBC # BLD AUTO: 10.3 K/UL (ref 4.6–13.2)

## 2022-02-14 PROCEDURE — 85730 THROMBOPLASTIN TIME PARTIAL: CPT

## 2022-02-14 PROCEDURE — 82962 GLUCOSE BLOOD TEST: CPT

## 2022-02-14 PROCEDURE — 65660000000 HC RM CCU STEPDOWN

## 2022-02-14 PROCEDURE — 71045 X-RAY EXAM CHEST 1 VIEW: CPT

## 2022-02-14 PROCEDURE — 74011250637 HC RX REV CODE- 250/637: Performed by: HOSPITALIST

## 2022-02-14 PROCEDURE — 36415 COLL VENOUS BLD VENIPUNCTURE: CPT

## 2022-02-14 PROCEDURE — C8929 TTE W OR WO FOL WCON,DOPPLER: HCPCS

## 2022-02-14 PROCEDURE — 74011250636 HC RX REV CODE- 250/636: Performed by: EMERGENCY MEDICINE

## 2022-02-14 PROCEDURE — 74011250636 HC RX REV CODE- 250/636: Performed by: HOSPITALIST

## 2022-02-14 PROCEDURE — 83036 HEMOGLOBIN GLYCOSYLATED A1C: CPT

## 2022-02-14 PROCEDURE — 85025 COMPLETE CBC W/AUTO DIFF WBC: CPT

## 2022-02-14 PROCEDURE — 74011250636 HC RX REV CODE- 250/636

## 2022-02-14 PROCEDURE — 74011636637 HC RX REV CODE- 636/637: Performed by: HOSPITALIST

## 2022-02-14 PROCEDURE — 77010033678 HC OXYGEN DAILY

## 2022-02-14 PROCEDURE — 74011000250 HC RX REV CODE- 250: Performed by: HOSPITALIST

## 2022-02-14 PROCEDURE — 80053 COMPREHEN METABOLIC PANEL: CPT

## 2022-02-14 PROCEDURE — 74011250637 HC RX REV CODE- 250/637: Performed by: INTERNAL MEDICINE

## 2022-02-14 RX ORDER — HEPARIN SODIUM 1000 [USP'U]/ML
3000 INJECTION, SOLUTION INTRAVENOUS; SUBCUTANEOUS ONCE
Status: COMPLETED | OUTPATIENT
Start: 2022-02-14 | End: 2022-02-14

## 2022-02-14 RX ORDER — BUPROPION HYDROCHLORIDE 100 MG/1
100 TABLET, EXTENDED RELEASE ORAL 2 TIMES DAILY
Status: DISCONTINUED | OUTPATIENT
Start: 2022-02-14 | End: 2022-02-18 | Stop reason: HOSPADM

## 2022-02-14 RX ORDER — HEPARIN SODIUM 5000 [USP'U]/ML
3000 INJECTION, SOLUTION INTRAVENOUS; SUBCUTANEOUS ONCE
Status: DISCONTINUED | OUTPATIENT
Start: 2022-02-14 | End: 2022-02-14

## 2022-02-14 RX ORDER — GLIPIZIDE 10 MG/1
5 TABLET, FILM COATED, EXTENDED RELEASE ORAL 2 TIMES DAILY
COMMUNITY

## 2022-02-14 RX ORDER — SITAGLIPTIN AND METFORMIN HYDROCHLORIDE 50; 1000 MG/1; MG/1
1 TABLET, FILM COATED ORAL 2 TIMES DAILY WITH MEALS
COMMUNITY

## 2022-02-14 RX ORDER — HEPARIN SODIUM 1000 [USP'U]/ML
40 INJECTION, SOLUTION INTRAVENOUS; SUBCUTANEOUS ONCE
Status: COMPLETED | OUTPATIENT
Start: 2022-02-14 | End: 2022-02-14

## 2022-02-14 RX ORDER — ATORVASTATIN CALCIUM 80 MG/1
80 TABLET, FILM COATED ORAL DAILY
COMMUNITY

## 2022-02-14 RX ORDER — ASPIRIN 81 MG/1
81 TABLET ORAL DAILY
COMMUNITY

## 2022-02-14 RX ORDER — POTASSIUM CHLORIDE 20 MEQ/1
40 TABLET, EXTENDED RELEASE ORAL
Status: COMPLETED | OUTPATIENT
Start: 2022-02-14 | End: 2022-02-14

## 2022-02-14 RX ORDER — HEPARIN SODIUM 1000 [USP'U]/ML
INJECTION, SOLUTION INTRAVENOUS; SUBCUTANEOUS
Status: COMPLETED
Start: 2022-02-14 | End: 2022-02-14

## 2022-02-14 RX ORDER — INSULIN GLARGINE 100 [IU]/ML
50 INJECTION, SOLUTION SUBCUTANEOUS
COMMUNITY

## 2022-02-14 RX ORDER — BUPROPION HYDROCHLORIDE 150 MG/1
TABLET, EXTENDED RELEASE ORAL 2 TIMES DAILY
COMMUNITY

## 2022-02-14 RX ORDER — INSULIN GLARGINE 100 [IU]/ML
25 INJECTION, SOLUTION SUBCUTANEOUS
Status: DISCONTINUED | OUTPATIENT
Start: 2022-02-14 | End: 2022-02-15

## 2022-02-14 RX ORDER — LISINOPRIL 40 MG/1
40 TABLET ORAL DAILY
COMMUNITY
End: 2022-02-18

## 2022-02-14 RX ADMIN — INSULIN GLARGINE 25 UNITS: 100 INJECTION, SOLUTION SUBCUTANEOUS at 21:59

## 2022-02-14 RX ADMIN — SODIUM CHLORIDE, PRESERVATIVE FREE 1 ML: 5 INJECTION INTRAVENOUS at 10:24

## 2022-02-14 RX ADMIN — BUPROPION HYDROCHLORIDE 100 MG: 100 TABLET, EXTENDED RELEASE ORAL at 21:59

## 2022-02-14 RX ADMIN — HEPARIN SODIUM 3000 UNITS: 1000 INJECTION, SOLUTION INTRAVENOUS; SUBCUTANEOUS at 13:07

## 2022-02-14 RX ADMIN — FUROSEMIDE 40 MG: 10 INJECTION, SOLUTION INTRAMUSCULAR; INTRAVENOUS at 09:34

## 2022-02-14 RX ADMIN — HEPARIN SODIUM 3000 UNITS: 1000 INJECTION INTRAVENOUS; SUBCUTANEOUS at 13:07

## 2022-02-14 RX ADMIN — ATORVASTATIN CALCIUM 40 MG: 20 TABLET, FILM COATED ORAL at 21:59

## 2022-02-14 RX ADMIN — Medication 6 UNITS: at 22:00

## 2022-02-14 RX ADMIN — POTASSIUM CHLORIDE 40 MEQ: 20 TABLET, EXTENDED RELEASE ORAL at 12:44

## 2022-02-14 RX ADMIN — HEPARIN SODIUM 3180 UNITS: 1000 INJECTION INTRAVENOUS; SUBCUTANEOUS at 05:07

## 2022-02-14 RX ADMIN — HEPARIN SODIUM 18 UNITS/KG/HR: 5000 INJECTION INTRAVENOUS; SUBCUTANEOUS at 12:58

## 2022-02-14 RX ADMIN — Medication 6 UNITS: at 11:30

## 2022-02-14 NOTE — DIABETES MGMT
Diabetes/ Glycemic Control Plan of Care  Recommendations:   Recommend meal time insulin for postprandial hyperglycemia     Assessment: Patient with a history of diabetes currently under isolation for COVID infection. POC glucose ranging  mg/dl over the last 24 hours. Fasting blood glucose this morning WNL of 101 mg/dl. Patient with post-prandial glucose excursion today of 291 mg/dl. DX:   1. Non-ST elevated myocardial infarction ([address]     2. Hypoxia          Recent Glucose Results:   Lab Results   Component Value Date/Time     (H) 02/14/2022 02:15 AM    GLUCPOC 291 (H) 02/14/2022 12:02 PM    GLUCPOC 101 02/14/2022 06:15 AM    GLUCPOC 180 (H) 02/13/2022 09:47 PM         Within target range (70-180mg/dL): No  Current insulin orders: 25 units Lantus, Humalog ACHS  Total Daily Dose previous 24 hours = 36 units (20 units Lantus + 16 units Humalog)  Current A1c: No results found for: HBA1C, FWT1AONH, FQR5TYEB     Nutrition/Diet:   Active Orders   Diet    ADULT DIET Regular; 4 carb choices (60 gm/meal)     Home diabetes medications:   Key Antihyperglycemic Medications             glipiZIDE SR (GLUCOTROL XL) 10 mg CR tablet (Taking) Take 5 mg by mouth two (2) times a day. insulin glargine (Lantus U-100 Insulin) 100 unit/mL injection (Taking) 50 Units by SubCUTAneous route nightly. Plan/Goals:   Blood glucose will be within target of 70 - 180 mg/dl within 72 hours  Reinforce dietary and medication compliance at home.          Education:  [] Refer to Diabetes Education Record                       [] Education not indicated at this time     Carlton Alvarez RD

## 2022-02-14 NOTE — PROGRESS NOTES
Reason for Admission:  C/o SOB, CP                     RUR Score:     4                Plan for utilizing home health:     TBD     PCP: First and Last name:  Izabella Clay MD     Name of Practice:    Are you a current patient: Yes/No:    Approximate date of last visit:    Can you participate in a virtual visit with your PCP:                     Current Advanced Directive/Advance Care Plan: Full Code      Healthcare Decision Maker:   Click here to complete [address] including selection of the Healthcare Decision Maker Relationship (ie \"Primary\")                             Transition of Care Plan:   Chart reviewed, pt arrived to ED with c/o SOB, reports tested positive for covid 1-31-22, fully vaccinated. Hx includes DM, pt admitted for further medical management  Of NSTEMI,DM. Cm will cont to review and remain available for d/c planning.   Care Management Interventions  Palliative Care Criteria Met (RRAT>21 & CHF Dx)?: No  Support Systems: Spouse/Significant Other  Confirm Follow Up Transport: Family  Discharge Location  Patient Expects to be Discharged to[de-identified] Home

## 2022-02-14 NOTE — PROGRESS NOTES
Problem: Falls - Risk of  Goal: *Absence of Falls  Description: Document Izzy Spencer Fall Risk and appropriate interventions in the flowsheet. Outcome: Progressing Towards Goal  Note: Fall Risk Interventions:  Mobility Interventions: Assess mobility with egress test,Communicate number of staff needed for ambulation/transfer,OT consult for ADLs,Patient to call before getting OOB,PT Consult for mobility concerns,PT Consult for assist device competence,Strengthening exercises (ROM-active/passive),Utilize walker, cane, or other assistive device         Medication Interventions: Bed/chair exit alarm,Evaluate medications/consider consulting pharmacy,Patient to call before getting OOB,Teach patient to arise slowly                   Problem: Patient Education: Go to Patient Education Activity  Goal: Patient/Family Education  Outcome: Progressing Towards Goal     Problem: Airway Clearance - Ineffective  Goal: Achieve or maintain patent airway  Outcome: Progressing Towards Goal     Problem: Gas Exchange - Impaired  Goal: Absence of hypoxia  Outcome: Progressing Towards Goal  Goal: Promote optimal lung function  Outcome: Progressing Towards Goal     Problem: Breathing Pattern - Ineffective  Goal: Ability to achieve and maintain a regular respiratory rate  Outcome: Progressing Towards Goal     Problem:  Body Temperature -  Risk of, Imbalanced  Goal: Ability to maintain a body temperature within defined limits  Outcome: Progressing Towards Goal  Goal: Will regain or maintain usual level of consciousness  Outcome: Progressing Towards Goal  Goal: Complications related to the disease process, condition or treatment will be avoided or minimized  Outcome: Progressing Towards Goal     Problem: Isolation Precautions - Risk of Spread of Infection  Goal: Prevent transmission of infectious organism to others  Outcome: Progressing Towards Goal     Problem: Nutrition Deficits  Goal: Optimize nutrtional status  Outcome: Progressing Towards Goal     Problem: Risk for Fluid Volume Deficit  Goal: Maintain normal heart rhythm  Outcome: Progressing Towards Goal  Goal: Maintain absence of muscle cramping  Outcome: Progressing Towards Goal  Goal: Maintain normal serum potassium, sodium, calcium, phosphorus, and pH  Outcome: Progressing Towards Goal     Problem: Loneliness or Risk for Loneliness  Goal: Demonstrate positive use of time alone when socialization is not possible  Outcome: Progressing Towards Goal     Problem: Fatigue  Goal: Verbalize increase energy and improved vitality  Outcome: Progressing Towards Goal     Problem: Patient Education: Go to Patient Education Activity  Goal: Patient/Family Education  Outcome: Progressing Towards Goal

## 2022-02-15 PROBLEM — I50.23 SYSTOLIC CHF, ACUTE ON CHRONIC (HCC): Status: ACTIVE | Noted: 2022-02-15

## 2022-02-15 PROBLEM — U07.1 COVID-19: Status: ACTIVE | Noted: 2022-02-15

## 2022-02-15 LAB
ALBUMIN SERPL-MCNC: 2.6 G/DL (ref 3.4–5)
ALBUMIN/GLOB SERPL: 0.9 {RATIO} (ref 0.8–1.7)
ALP SERPL-CCNC: 96 U/L (ref 45–117)
ALT SERPL-CCNC: 111 U/L (ref 16–61)
ANION GAP SERPL CALC-SCNC: 6 MMOL/L (ref 3–18)
APTT PPP: 157 SEC (ref 23–36.4)
APTT PPP: 28.4 SEC (ref 23–36.4)
APTT PPP: 54.6 SEC (ref 23–36.4)
APTT PPP: 55.1 SEC (ref 23–36.4)
AST SERPL-CCNC: 33 U/L (ref 10–38)
BASOPHILS # BLD: 0 K/UL (ref 0–0.1)
BASOPHILS NFR BLD: 1 % (ref 0–2)
BILIRUB SERPL-MCNC: 0.4 MG/DL (ref 0.2–1)
BUN SERPL-MCNC: 18 MG/DL (ref 7–18)
BUN/CREAT SERPL: 20 (ref 12–20)
CALCIUM SERPL-MCNC: 8.8 MG/DL (ref 8.5–10.1)
CHLORIDE SERPL-SCNC: 106 MMOL/L (ref 100–111)
CO2 SERPL-SCNC: 30 MMOL/L (ref 21–32)
CREAT SERPL-MCNC: 0.92 MG/DL (ref 0.6–1.3)
DIFFERENTIAL METHOD BLD: ABNORMAL
EOSINOPHIL # BLD: 0.1 K/UL (ref 0–0.4)
EOSINOPHIL NFR BLD: 1 % (ref 0–5)
ERYTHROCYTE [DISTWIDTH] IN BLOOD BY AUTOMATED COUNT: 14.1 % (ref 11.6–14.5)
GLOBULIN SER CALC-MCNC: 2.8 G/DL (ref 2–4)
GLUCOSE BLD STRIP.AUTO-MCNC: 117 MG/DL (ref 70–110)
GLUCOSE BLD STRIP.AUTO-MCNC: 126 MG/DL (ref 70–110)
GLUCOSE BLD STRIP.AUTO-MCNC: 224 MG/DL (ref 70–110)
GLUCOSE BLD STRIP.AUTO-MCNC: 83 MG/DL (ref 70–110)
GLUCOSE SERPL-MCNC: 85 MG/DL (ref 74–99)
HCT VFR BLD AUTO: 41.1 % (ref 36–48)
HGB BLD-MCNC: 13.3 G/DL (ref 13–16)
IMM GRANULOCYTES # BLD AUTO: 0 K/UL (ref 0–0.04)
IMM GRANULOCYTES NFR BLD AUTO: 0 % (ref 0–0.5)
LYMPHOCYTES # BLD: 2 K/UL (ref 0.9–3.6)
LYMPHOCYTES NFR BLD: 24 % (ref 21–52)
MCH RBC QN AUTO: 28.2 PG (ref 24–34)
MCHC RBC AUTO-ENTMCNC: 32.4 G/DL (ref 31–37)
MCV RBC AUTO: 87.3 FL (ref 78–100)
MONOCYTES # BLD: 0.9 K/UL (ref 0.05–1.2)
MONOCYTES NFR BLD: 11 % (ref 3–10)
NEUTS SEG # BLD: 5.2 K/UL (ref 1.8–8)
NEUTS SEG NFR BLD: 64 % (ref 40–73)
NRBC # BLD: 0 K/UL (ref 0–0.01)
NRBC BLD-RTO: 0 PER 100 WBC
PLATELET # BLD AUTO: 398 K/UL (ref 135–420)
PMV BLD AUTO: 10.2 FL (ref 9.2–11.8)
POTASSIUM SERPL-SCNC: 4.1 MMOL/L (ref 3.5–5.5)
PROT SERPL-MCNC: 5.4 G/DL (ref 6.4–8.2)
RBC # BLD AUTO: 4.71 M/UL (ref 4.35–5.65)
SARS-COV-2, COV2: NORMAL
SODIUM SERPL-SCNC: 142 MMOL/L (ref 136–145)
TROPONIN-HIGH SENSITIVITY: 1023 NG/L (ref 0–78)
TROPONIN-HIGH SENSITIVITY: 1213 NG/L (ref 0–78)
WBC # BLD AUTO: 8.1 K/UL (ref 4.6–13.2)

## 2022-02-15 PROCEDURE — 84484 ASSAY OF TROPONIN QUANT: CPT

## 2022-02-15 PROCEDURE — 74011636637 HC RX REV CODE- 636/637: Performed by: HOSPITALIST

## 2022-02-15 PROCEDURE — 85730 THROMBOPLASTIN TIME PARTIAL: CPT

## 2022-02-15 PROCEDURE — U0003 INFECTIOUS AGENT DETECTION BY NUCLEIC ACID (DNA OR RNA); SEVERE ACUTE RESPIRATORY SYNDROME CORONAVIRUS 2 (SARS-COV-2) (CORONAVIRUS DISEASE [COVID-19]), AMPLIFIED PROBE TECHNIQUE, MAKING USE OF HIGH THROUGHPUT TECHNOLOGIES AS DESCRIBED BY CMS-2020-01-R: HCPCS

## 2022-02-15 PROCEDURE — 82962 GLUCOSE BLOOD TEST: CPT

## 2022-02-15 PROCEDURE — 80053 COMPREHEN METABOLIC PANEL: CPT

## 2022-02-15 PROCEDURE — 74011250637 HC RX REV CODE- 250/637: Performed by: INTERNAL MEDICINE

## 2022-02-15 PROCEDURE — 77010033678 HC OXYGEN DAILY

## 2022-02-15 PROCEDURE — 74011250636 HC RX REV CODE- 250/636: Performed by: HOSPITALIST

## 2022-02-15 PROCEDURE — 85025 COMPLETE CBC W/AUTO DIFF WBC: CPT

## 2022-02-15 PROCEDURE — 65660000000 HC RM CCU STEPDOWN

## 2022-02-15 PROCEDURE — 74011250636 HC RX REV CODE- 250/636: Performed by: EMERGENCY MEDICINE

## 2022-02-15 PROCEDURE — 36415 COLL VENOUS BLD VENIPUNCTURE: CPT

## 2022-02-15 PROCEDURE — 74011250637 HC RX REV CODE- 250/637: Performed by: HOSPITALIST

## 2022-02-15 RX ORDER — HEPARIN SODIUM 5000 [USP'U]/ML
3000 INJECTION, SOLUTION INTRAVENOUS; SUBCUTANEOUS ONCE
Status: COMPLETED | OUTPATIENT
Start: 2022-02-15 | End: 2022-02-15

## 2022-02-15 RX ORDER — LOSARTAN POTASSIUM 25 MG/1
25 TABLET ORAL DAILY
Status: DISCONTINUED | OUTPATIENT
Start: 2022-02-16 | End: 2022-02-16

## 2022-02-15 RX ORDER — ZINC SULFATE 50(220)MG
1 CAPSULE ORAL DAILY
Status: DISCONTINUED | OUTPATIENT
Start: 2022-02-16 | End: 2022-02-18 | Stop reason: HOSPADM

## 2022-02-15 RX ORDER — MELATONIN
2000 DAILY
Status: DISCONTINUED | OUTPATIENT
Start: 2022-02-15 | End: 2022-02-15

## 2022-02-15 RX ORDER — ASCORBIC ACID 250 MG
500 TABLET ORAL DAILY
Status: DISCONTINUED | OUTPATIENT
Start: 2022-02-16 | End: 2022-02-18 | Stop reason: HOSPADM

## 2022-02-15 RX ORDER — MELATONIN
2000 DAILY
Status: DISCONTINUED | OUTPATIENT
Start: 2022-02-16 | End: 2022-02-18 | Stop reason: HOSPADM

## 2022-02-15 RX ORDER — HEPARIN SODIUM 1000 [USP'U]/ML
3000 INJECTION, SOLUTION INTRAVENOUS; SUBCUTANEOUS ONCE
Status: COMPLETED | OUTPATIENT
Start: 2022-02-15 | End: 2022-02-15

## 2022-02-15 RX ORDER — INSULIN GLARGINE 100 [IU]/ML
20 INJECTION, SOLUTION SUBCUTANEOUS
Status: DISCONTINUED | OUTPATIENT
Start: 2022-02-15 | End: 2022-02-16

## 2022-02-15 RX ORDER — CALCIUM CARB/MAGNESIUM CARB 311-232MG
5 TABLET ORAL
Status: DISCONTINUED | OUTPATIENT
Start: 2022-02-15 | End: 2022-02-18 | Stop reason: HOSPADM

## 2022-02-15 RX ORDER — IBUPROFEN 200 MG
1 TABLET ORAL DAILY
Status: DISCONTINUED | OUTPATIENT
Start: 2022-02-16 | End: 2022-02-18 | Stop reason: HOSPADM

## 2022-02-15 RX ORDER — HEPARIN SODIUM 1000 [USP'U]/ML
5000 INJECTION, SOLUTION INTRAVENOUS; SUBCUTANEOUS ONCE
Status: DISCONTINUED | OUTPATIENT
Start: 2022-02-15 | End: 2022-02-15

## 2022-02-15 RX ORDER — INSULIN LISPRO 100 [IU]/ML
3 INJECTION, SOLUTION INTRAVENOUS; SUBCUTANEOUS
Status: DISCONTINUED | OUTPATIENT
Start: 2022-02-15 | End: 2022-02-18 | Stop reason: HOSPADM

## 2022-02-15 RX ORDER — METOPROLOL TARTRATE 25 MG/1
12.5 TABLET, FILM COATED ORAL EVERY 12 HOURS
Status: DISCONTINUED | OUTPATIENT
Start: 2022-02-15 | End: 2022-02-18 | Stop reason: HOSPADM

## 2022-02-15 RX ADMIN — HEPARIN SODIUM 3000 UNITS: 5000 INJECTION, SOLUTION INTRAVENOUS; SUBCUTANEOUS at 17:16

## 2022-02-15 RX ADMIN — Medication 5 MG: at 22:32

## 2022-02-15 RX ADMIN — HEPARIN SODIUM 22 UNITS/KG/HR: 5000 INJECTION INTRAVENOUS; SUBCUTANEOUS at 12:01

## 2022-02-15 RX ADMIN — BUPROPION HYDROCHLORIDE 100 MG: 100 TABLET, EXTENDED RELEASE ORAL at 22:32

## 2022-02-15 RX ADMIN — BUPROPION HYDROCHLORIDE 100 MG: 100 TABLET, EXTENDED RELEASE ORAL at 08:06

## 2022-02-15 RX ADMIN — Medication 3 UNITS: at 17:01

## 2022-02-15 RX ADMIN — FUROSEMIDE 40 MG: 10 INJECTION, SOLUTION INTRAMUSCULAR; INTRAVENOUS at 08:06

## 2022-02-15 RX ADMIN — METOPROLOL TARTRATE 12.5 MG: 25 TABLET, FILM COATED ORAL at 22:32

## 2022-02-15 RX ADMIN — HEPARIN SODIUM 3000 UNITS: 1000 INJECTION INTRAVENOUS; SUBCUTANEOUS at 03:35

## 2022-02-15 RX ADMIN — Medication 2000 UNITS: at 23:22

## 2022-02-15 RX ADMIN — INSULIN GLARGINE 20 UNITS: 100 INJECTION, SOLUTION SUBCUTANEOUS at 22:32

## 2022-02-15 RX ADMIN — METOPROLOL TARTRATE 12.5 MG: 25 TABLET, FILM COATED ORAL at 15:08

## 2022-02-15 RX ADMIN — ATORVASTATIN CALCIUM 40 MG: 20 TABLET, FILM COATED ORAL at 22:32

## 2022-02-15 RX ADMIN — ASPIRIN 81 MG: 81 TABLET, COATED ORAL at 08:06

## 2022-02-15 RX ADMIN — Medication 4 UNITS: at 12:35

## 2022-02-15 NOTE — PROGRESS NOTES
DC Plan: home with MD follow up     Care manager rounded in droplet precautions PPE for positive covid; patient appeared dissatisfied with quite a few missed opportunities for chances to explain patient status and treatment in patient's opinion. Care manager spent 30 minutes listening to patient and offering explanations and asking for patient feedback as appropriate to information being provided. Patient appears very concerned about his covid diagnosis and potential risk to his wife who is immunocompromised with cancer; appears fixated on wanting an additional covid test although patient was diagnosed 1/31 with covid and was symptomatic- he and his wife have both had two doses and have been boosted. Care manager will continue to follow patient for discharge needs.     Care Management Interventions  Palliative Care Criteria Met (RRAT>21 & CHF Dx)?: No  Support Systems: Spouse/Significant Other  Confirm Follow Up Transport: Family  Discharge Location  Patient Expects to be Discharged to[de-identified] Home

## 2022-02-15 NOTE — DIABETES MGMT
Diabetes Patient/Family Education Record    Factors That May Influence Patients Ability to Learn or Comply with Recommendations   []   Language barrier    []   Cultural needs   []   Motivation    []   Cognitive limitation    []   Physical   []   Education    []   Physiological factors   []   Hearing/vision/speaking impairment   []   Taoist beliefs    []   Financial factors   [x]  Other:   []  No factors identified at this time. Person Instructed:   [x]   Patient   []   Family   []  Other     Preference for Learning:   []   Verbal   []   Written   []  Demonstration     Level of Comprehension & Competence:    [x]  Good                                      [] Fair                                     []  Poor                             []  Needs Reinforcement   []  Teach back completed    Education Component: Patient reports occasionally missing some of his oral diabetes medications especially lately due to falling asleep earlier due to illness and medications. Patient rarely monitors his blood sugar at home but his PCP wants him to check it 3 times per day. Patent works at [employer]  and he has limited space and time to monitor his blood sugars. Patient unaware of his last HgbA1c though believes this admission of 11.4% is higher than previous draws. Patient aware of carbohydrate containing foods though does not limit these in his diet. His wife is the primary cook of the home and is currently battling cancer and so patient does not feel he has a say in his meals.      []  Medication management, including how to administer insulin (if appropriate) and potential medication interactions    []  Nutritional management - [x] Obtained usual meal pattern   [x]   Basic carbohydrate counting  []  Plate method  []  Limit concentrated sweets and avoid sweetened beverages  []  Portion control  []    Avoid skipping meals   []  Exercise   []  Signs, symptoms, and treatment of hyperglycemia and hypoglycemia   [x] Prevention, recognition and treatment of hyperglycemia and hypoglycemia   [x]  Importance of blood glucose monitoring  [] Blood Glucose targets   []   Provided patient with blood glucose meter  [x]  Has glucometer and supplies at home   []  Instruction on use of the blood glucose meter and recommended monitoring schedule   [x]  Discuss the importance of HbA1C monitoring. Patients A1c is 11.4 %.  This is equivalent to average glucose of 280 mg/dl for the past 2-3 months.   []  Sick day guidelines   []  Proper use and disposal of lancets, needles, syringes or insulin pens (if appropriate)   []  Potential long-term complications (retinopathy, kidney disease, neuropathy, foot care)   [] Information about whom to contact in case of emergency or for more information    []  Goal:  Patient/family will demonstrate understanding of Diabetes Self- Management Skills by: (date) __2/22_____  Plan for post-discharge education or self-management support:    [] Outpatient class schedule provided            [] Patient Declined    [] Scheduled for outpatient classes (date) _______    [] Written information provided  Verify: [x] Prior to admission Diabetes medications    Does patient understand how diabetes medications work? ____________________________  Does patient have difficulty obtaining diabetes medications or testing supplies? _________________     Donna Lopez RD  Glycemic Control Team  [phone]    Monday-Friday   9 am - 3 pm

## 2022-02-15 NOTE — PROGRESS NOTES
Called by nurse as patient had questions  I called  into  His room  Answered questions  To the best of my ability with chart review  Covid 1/31  CHF ef 35 percent  On 2 liters of oxygen  DM and smoking as cardiac risk factors

## 2022-02-15 NOTE — DIABETES MGMT
Diabetes/ Glycemic Control Plan of Care  Recommendations:   Recommend decreasing Lantus to 20 units q 24 hrs. Recommend to initiate 3 units Humalog qAC to prevent postprandial hyperglycemia   Discussed recommendations with hospitalist    Assessment: Patient with history of diabetes, POC glucose ranging  mg/dl with notable excursions postprandially. Fasting blood glucose today of 83 mg/dl on current regimen. Recent Glucose Results:   Lab Results   Component Value Date/Time    GLU 85 02/15/2022 02:00 AM    GLUCPOC 224 (H) 02/15/2022 12:01 PM    GLUCPOC 83 02/15/2022 05:19 AM    GLUCPOC 256 (H) 02/14/2022 09:24 PM        Within target range (70-180mg/dL): No  Current insulin orders: 25 units Lantus, corrective Humalog ACHS  Total Daily Dose previous 24 hours =  37 units (25 units Lantus + 12 units Humalog)     Plan/Goals:   Blood glucose will be within target of 70 - 180 mg/dl within 72 hours  Reinforce dietary and medication compliance at home.          Education:  [x] Refer to Diabetes Education Record                       [] Education not indicated at this time     Kaia Oates RD  Glycemic Control Team  [phone]    Monday-Friday   9 am - 3 pm

## 2022-02-15 NOTE — PROGRESS NOTES
Hospitalist Progress Note    Patient: Viviana Levine MRN: [id number]  CSN: [id number]    YOB: 1965  Age: 49 y.o. Sex: male    DOA: 2/13/2022 LOS:  LOS: 1 day                Assessment/Plan     Patient Active Problem List   Diagnosis Code    Hypoxia R09.02    [facility] I21.4    DM [facility]diabetes mellitus) [facility] E11.9    Pleural effusion J90    Chest pain R07.9        Chief complaint :  Chest pain, SOB    No chest pain, feeling better. Shortness of breath-  Likely secondary to CHF, pleural effusion  Started on diuresis  Follow-up chest x-ray.     CHF-  Follow echo, will check NT proBNP.     Pleural effusion-  Likely secondary to CHF  Diuresis as above     NSTEMI-  On heparin drip  Further testing per cardiology.     DM-  Uncontrolled  Low-dose Lantus  Sliding-scale insulin  Diabetic diet. COVID 19    Disposition : 2-3 days    Review of systems  General: No fevers or chills. Cardiovascular: No chest pain or pressure. No palpitations. Pulmonary: No shortness of breath. Gastrointestinal: No nausea, vomiting. Physical Exam:  General: Awake, cooperative, no acute distress    HEENT: NC, Atraumatic. PERRLA, anicteric sclerae. Lungs: Decreased breath sounds lower lungs bilaterally. Heart:  S1 S2,  No murmur, No Rubs, No Gallops  Abdomen: Soft, Non distended, Non tender.  +Bowel sounds,   Extremities: No c/c/e  Psych:   Not anxious or agitated. Neurologic:  No acute neurological deficit. Vital signs/Intake and Output:  Visit Vitals  /65   Pulse 69   Temp 97.3 °F (36.3 °C)   Resp 18   Ht 5' 8\" (1.727 m)   Wt 79.4 kg (175 lb)   SpO2 99%   BMI 26.61 kg/m²     Current Shift:  No intake/output data recorded.   Last three shifts:  02/13 0701 - 02/14 1900  In: 1020 [P.O.:1020]  Out: 600 [Urine:600]            Labs: Results:       Chemistry Recent Labs     02/14/22  0215 02/13/22  0815   * 387*    140   K 3.3* 4.4  105   CO2 28 27   BUN 16 18   CREA 0.75 1.08   CA 8.7 9.1   AGAP 7 8   BUCR 21* 17   AP 95 131*   TP 5.8* 6.5   ALB 2.8* 3.4   GLOB 3.0 3.1   AGRAT 0.9 1.1      CBC w/Diff Recent Labs     02/14/22  0215 02/13/22  0815   WBC 10.3 12.0   RBC 4.77 5.36   HGB 13.4 14.9   HCT 41.1 47.1    477*   GRANS 67 78*   LYMPH 23 13*   EOS 1 1      Cardiac Enzymes No results for input(s): CPK, CKND1, PETTY in the last 72 hours. No lab exists for component: CKRMB, TROIP   Coagulation Recent Labs     02/14/22  1923 02/14/22  1200 02/13/22  1755 02/13/22  0815   PTP  --   --   --  12.6   INR  --   --   --  1.0   APTT 57.4* 32.3   < > 27.8    < > = values in this interval not displayed. Lipid Panel No results found for: CHOL, CHOLPOCT, CHOLX, CHLST, CHOLV, 006066, HDL, HDLP, LDL, LDLC, DLDLP, 439596, VLDLC, VLDL, TGLX, TRIGL, TRIGP, TGLPOCT, CHHD, CHHDX   BNP No results for input(s): BNPP in the last 72 hours.    Liver Enzymes Recent Labs     02/14/22  0215   TP 5.8*   ALB 2.8*   AP 95      Thyroid Studies No results found for: T4, T3U, TSH, TSHEXT     Procedures/imaging: see electronic medical records for all procedures/Xrays and details which were not copied into this note but were reviewed prior to creation of Plan

## 2022-02-15 NOTE — PROGRESS NOTES
conducted an initial consultation and Spiritual Assessment for Adelaida Carey, who is a 49 y.o.,male. Patient's Primary Language is: [place]. According to the patient's EMR Caodaism Affiliation is: No Scientologist. The reason the Patient came to the hospital is:   Patient Active Problem List    Diagnosis Date Noted    Hypoxia 02/13/2022    [facility] 02/13/2022    Pleural effusion 02/13/2022    Chest pain 02/13/2022    DM (diabetes mellitus) ([facility]         The  provided the following Interventions:  Initiated a relationship of care and support. Explored issues of radha, belief, spirituality and Mosque/ritual needs while hospitalized. Listened empathically as patient expressed in detail his emotional frustration. Offered prayer and assurance of continued prayers on patient's behalf. The following outcomes where achieved:  Patient shared limited information about both their medical narrative and spiritual journey/beliefs.  confirmed Patient's Caodaism Affiliation. Patient processed feeling about current hospitalization. Patient expressed gratitude for 's visit. Assessment:  Patient does not have any Mosque/cultural needs that will affect patient's preferences in health care. There are no spiritual or Mosque issues which require intervention at this time. Plan:  Chaplains will continue to follow and will provide pastoral care on an as needed/requested basis.  recommends bedside caregivers page  on duty if patient shows signs of acute spiritual or emotional distress.     [address]

## 2022-02-16 LAB
ALBUMIN SERPL-MCNC: 2.8 G/DL (ref 3.4–5)
ALBUMIN/GLOB SERPL: 0.8 {RATIO} (ref 0.8–1.7)
ALP SERPL-CCNC: 93 U/L (ref 45–117)
ALT SERPL-CCNC: 85 U/L (ref 16–61)
ANION GAP SERPL CALC-SCNC: 7 MMOL/L (ref 3–18)
APTT PPP: 107.6 SEC (ref 23–36.4)
AST SERPL-CCNC: 25 U/L (ref 10–38)
BILIRUB SERPL-MCNC: 0.3 MG/DL (ref 0.2–1)
BUN SERPL-MCNC: 16 MG/DL (ref 7–18)
BUN/CREAT SERPL: 22 (ref 12–20)
CALCIUM SERPL-MCNC: 8.7 MG/DL (ref 8.5–10.1)
CHLORIDE SERPL-SCNC: 108 MMOL/L (ref 100–111)
CO2 SERPL-SCNC: 26 MMOL/L (ref 21–32)
CREAT SERPL-MCNC: 0.72 MG/DL (ref 0.6–1.3)
ERYTHROCYTE [DISTWIDTH] IN BLOOD BY AUTOMATED COUNT: 14.2 % (ref 11.6–14.5)
GLOBULIN SER CALC-MCNC: 3.6 G/DL (ref 2–4)
GLUCOSE BLD STRIP.AUTO-MCNC: 141 MG/DL (ref 70–110)
GLUCOSE BLD STRIP.AUTO-MCNC: 185 MG/DL (ref 70–110)
GLUCOSE BLD STRIP.AUTO-MCNC: 205 MG/DL (ref 70–110)
GLUCOSE BLD STRIP.AUTO-MCNC: 54 MG/DL (ref 70–110)
GLUCOSE BLD STRIP.AUTO-MCNC: 55 MG/DL (ref 70–110)
GLUCOSE SERPL-MCNC: 51 MG/DL (ref 74–99)
HCT VFR BLD AUTO: 43 % (ref 36–48)
HGB BLD-MCNC: 14.1 G/DL (ref 13–16)
MCH RBC QN AUTO: 28.8 PG (ref 24–34)
MCHC RBC AUTO-ENTMCNC: 32.8 G/DL (ref 31–37)
MCV RBC AUTO: 87.8 FL (ref 78–100)
NRBC # BLD: 0 K/UL (ref 0–0.01)
NRBC BLD-RTO: 0 PER 100 WBC
PLATELET # BLD AUTO: 404 K/UL (ref 135–420)
PMV BLD AUTO: 10.5 FL (ref 9.2–11.8)
POTASSIUM SERPL-SCNC: 3.5 MMOL/L (ref 3.5–5.5)
PROT SERPL-MCNC: 6.4 G/DL (ref 6.4–8.2)
RBC # BLD AUTO: 4.9 M/UL (ref 4.35–5.65)
SARS-COV-2, NAA: DETECTED
SODIUM SERPL-SCNC: 141 MMOL/L (ref 136–145)
WBC # BLD AUTO: 8.7 K/UL (ref 4.6–13.2)

## 2022-02-16 PROCEDURE — 80053 COMPREHEN METABOLIC PANEL: CPT

## 2022-02-16 PROCEDURE — 82962 GLUCOSE BLOOD TEST: CPT

## 2022-02-16 PROCEDURE — 74011250636 HC RX REV CODE- 250/636: Performed by: EMERGENCY MEDICINE

## 2022-02-16 PROCEDURE — 74011250636 HC RX REV CODE- 250/636: Performed by: HOSPITALIST

## 2022-02-16 PROCEDURE — 74011636637 HC RX REV CODE- 636/637: Performed by: HOSPITALIST

## 2022-02-16 PROCEDURE — 77010033678 HC OXYGEN DAILY

## 2022-02-16 PROCEDURE — 36415 COLL VENOUS BLD VENIPUNCTURE: CPT

## 2022-02-16 PROCEDURE — 85730 THROMBOPLASTIN TIME PARTIAL: CPT

## 2022-02-16 PROCEDURE — 74011250637 HC RX REV CODE- 250/637: Performed by: HOSPITALIST

## 2022-02-16 PROCEDURE — 65660000000 HC RM CCU STEPDOWN

## 2022-02-16 PROCEDURE — 85027 COMPLETE CBC AUTOMATED: CPT

## 2022-02-16 PROCEDURE — 74011250637 HC RX REV CODE- 250/637: Performed by: INTERNAL MEDICINE

## 2022-02-16 RX ORDER — LOSARTAN POTASSIUM 25 MG/1
12.5 TABLET ORAL DAILY
Status: DISCONTINUED | OUTPATIENT
Start: 2022-02-17 | End: 2022-02-18 | Stop reason: HOSPADM

## 2022-02-16 RX ORDER — INSULIN GLARGINE 100 [IU]/ML
10 INJECTION, SOLUTION SUBCUTANEOUS
Status: DISCONTINUED | OUTPATIENT
Start: 2022-02-16 | End: 2022-02-18 | Stop reason: HOSPADM

## 2022-02-16 RX ADMIN — Medication 2 UNITS: at 17:58

## 2022-02-16 RX ADMIN — FUROSEMIDE 40 MG: 10 INJECTION, SOLUTION INTRAMUSCULAR; INTRAVENOUS at 11:22

## 2022-02-16 RX ADMIN — METOPROLOL TARTRATE 12.5 MG: 25 TABLET, FILM COATED ORAL at 22:28

## 2022-02-16 RX ADMIN — ZINC SULFATE 220 MG (50 MG) CAPSULE 1 CAPSULE: CAPSULE at 09:19

## 2022-02-16 RX ADMIN — BUPROPION HYDROCHLORIDE 100 MG: 100 TABLET, EXTENDED RELEASE ORAL at 09:19

## 2022-02-16 RX ADMIN — HEPARIN SODIUM 22 UNITS/KG/HR: 5000 INJECTION INTRAVENOUS; SUBCUTANEOUS at 02:40

## 2022-02-16 RX ADMIN — Medication 500 MG: at 09:19

## 2022-02-16 RX ADMIN — ASPIRIN 81 MG: 81 TABLET, COATED ORAL at 09:19

## 2022-02-16 RX ADMIN — HEPARIN SODIUM 22 UNITS/KG/HR: 5000 INJECTION INTRAVENOUS; SUBCUTANEOUS at 17:52

## 2022-02-16 RX ADMIN — LOSARTAN POTASSIUM 25 MG: 25 TABLET, FILM COATED ORAL at 09:19

## 2022-02-16 RX ADMIN — INSULIN GLARGINE 10 UNITS: 100 INJECTION, SOLUTION SUBCUTANEOUS at 23:02

## 2022-02-16 RX ADMIN — Medication 4 UNITS: at 11:30

## 2022-02-16 RX ADMIN — Medication 5 MG: at 22:28

## 2022-02-16 RX ADMIN — BUPROPION HYDROCHLORIDE 100 MG: 100 TABLET, EXTENDED RELEASE ORAL at 22:28

## 2022-02-16 RX ADMIN — METOPROLOL TARTRATE 12.5 MG: 25 TABLET, FILM COATED ORAL at 09:19

## 2022-02-16 RX ADMIN — Medication 3 UNITS: at 09:18

## 2022-02-16 RX ADMIN — ATORVASTATIN CALCIUM 40 MG: 20 TABLET, FILM COATED ORAL at 22:27

## 2022-02-16 NOTE — PROGRESS NOTES
Cardiology Progress Note        Patient: Viviana Levine        Sex: male          DOA: 2/13/2022  YOB: 1965      Age:  49 y.o.        LOS:  LOS: 2 days   Assessment/Plan     Patient Active Problem List   Diagnosis Code    Hypoxia R09.02    Non-STEM[facility] (non-ST elevated [facility] infarction) ([facility] I21.4    DM (diabetes mellitus) ([facility] E11.9    Pleural effusion J90    Chest pain O47.5    Systolic CHF, acute on chronic (HCC) I50.23    COVID-19 U07.1       current smoker    CT chest reported     1. No pulmonary thromboembolic disease. 2. Moderate to large right and moderate left pleural effusions. Additional regions of patchy groundglass opacity are also seen throughout both lower lobe basilar segments and throughout both perihilar upper lobes.  Indeterminant to what extent the regions of groundglass opacity representing alveolitis related to moderate central airway disease or instead represent an acute atypical  infection such as acute Covid-19 pneumonia.     Echocardiogram revealed    Image quality: adequate. Definity contrast was given to enhance imaging.   Left Ventricle: Left ventricle size is normal. Mildly increased wall thickness. Mild hypokinesis of the following segments: basal anterior, mid anterior, mid inferior, apical anterior and apical inferior. Moderate hypokinesis of the following segments: basal anterolateral, basal inferolateral, mid anterolateral, mid inferolateral and apical lateral. Mildly reduced left ventricular systolic function with a visually estimated EF of 35 - 40%. Grade II diastolic dysfunction with increased LAP.   Left Atrium: Left atrium is mildly dilated.   Mitral Valve: Mildly calcified leaflets. Mild mitral annular calcification. Mild to moderate transvalvular regurgitation. No stenosis.     Pulmonary artery : Estimated pulmonary arterial systolic pressure is 44 mhg.  Pulmonary hypertension foun to be mild    Plan:    Continue aspirin and atorvastatin. IV heparin drip. Continue IV Lasix. Start metoprolol tartrate 12.5 mg by mouth twice a day. Will add ACE inhibitor /ARB and spironolactone as per blood pressure response. Salt restriction up to 2 g per day and fluid restriction up to 1.2 L per day  Patient is in droplet plus isolation  Further cardiac workup as clinically indicated               Subjective:    cc:   my breathing is better today, denies any chest pain      REVIEW OF SYSTEMS:     General: No fevers or chills. Cardiovascular: No chest pain,No palpitations, No orthopnea, No PND, No leg swelling, No claudication  Pulmonary: No dyspnea. Gastrointestinal: No nausea, vomiting, bleeding  Neurology: No Dizziness    Objective:      Visit Vitals  BP (!) 144/82   Pulse 64   Temp 97.8 °F (36.6 °C)   Resp 20   Ht 5' 8\" (1.727 m)   Wt 79.4 kg (175 lb)   SpO2 99%   BMI 26.61 kg/m²     Body mass index is 26.61 kg/m². Physical Exam:  General Appearance: Comfortable, not using accessory muscles of respiration. HEENT: EKATERINA. HEAD: Atraumatic  NECK: No JVD, no thyroidomeglay. CAROTIDS: No bruit  LUNGS: bibasilar absent air entry    HEART: S1+S2 audible, no murmur, no pericardial rub. ABD: Non-tender, BS Audible    EXT: No edema, and no cyanosis. VASCULAR EXAM: Pulses are intact. PSYCHIATRIC EXAM: Mood is appropriate. MUSCULOSKELETAL: Grossly no joint deformity.   NEUROLOGICAL: AAO times 3, No motor and sensory deficit    Medication:  Current Facility-Administered Medications   Medication Dose Route Frequency    insulin lispro (HUMALOG) injection 3 Units  3 Units SubCUTAneous TIDAC    insulin glargine (LANTUS) injection 20 Units  20 Units SubCUTAneous QHS    [START ON 2/16/2022] nicotine (NICODERM CQ) 14 mg/24 hr patch 1 Patch  1 Patch TransDERmal DAILY    metoprolol tartrate (LOPRESSOR) tablet 12.5 mg  12.5 mg Oral Q12H    [START ON 2/16/2022] ascorbic acid (vitamin C) (VITAMIN C) tablet 500 mg  500 mg Oral DAILY    melatonin (rapid dissolve) tablet 5 mg  5 mg Oral QHS    [START ON 2/16/2022] zinc sulfate (ZINCATE) 50 mg zinc (220 mg) capsule 1 Capsule  1 Capsule Oral DAILY    [START ON 2/16/2022] cholecalciferol (VITAMIN D3) (1000 Units /25 mcg) tablet 2,000 Units  2,000 Units Oral DAILY    [START ON 2/16/2022] losartan (COZAAR) tablet 25 mg  25 mg Oral DAILY    buPROPion SR (WELLBUTRIN SR) tablet 100 mg  100 mg Oral BID    heparin 25,000 units in  ml infusion  12-25 Units/kg/hr IntraVENous TITRATE    glucagon (GLUCAGEN) injection 1 mg  1 mg IntraMUSCular PRN    glucose chewable tablet 16 g  16 g Oral PRN    insulin lispro (HUMALOG) injection   SubCUTAneous AC&HS    furosemide (LASIX) injection 40 mg  40 mg IntraVENous DAILY    aspirin delayed-release tablet 81 mg  81 mg Oral DAILY    atorvastatin (LIPITOR) tablet 40 mg  40 mg Oral QHS               Lab/Data Reviewed:       Recent Labs     02/15/22  0200 02/14/22  0215 02/13/22  0815   WBC 8.1 10.3 12.0   HGB 13.3 13.4 14.9   HCT 41.1 41.1 47.1    406 477*     Recent Labs     02/15/22  0200 02/14/22  0215 02/13/22  0815    144 140   K 4.1 3.3* 4.4    109 105   CO2 30 28 27   GLU 85 101* 387*   BUN 18 16 18   CREA 0.92 0.75 1.08   CA 8.8 8.7 9.1       Signed By: Curtis Marx MD     February 15, 2022

## 2022-02-16 NOTE — PROGRESS NOTES
0730: report given to this nurse from HECTOR Millan RN    [phone]: MD Elton Jameson updates this nurse on pt condition, pt to have cardiac catheterization  Pineda Millan RN

## 2022-02-16 NOTE — PROGRESS NOTES
Bedside and Verbal shift change report given to Majo Pride RN (oncoming nurse) by Lucy Cherry RN (offgoing nurse). Report included the following information SBAR, Kardex, ED Summary, Intake/Output, MAR, Recent Results, Med Rec Status and Cardiac Rhythm NSR.       2003  Shift assessment complete   Pt resting quietly with eyes open and chest rising and falling evenly   No c/o pain or signs of distress  Bed locked and in lowest position   Call light within [address] Cardiac/Medical Night Shift Chart Audit    Chart Audit completed? YES        0641  bs 54 - pt given 2 orange juices and jannie crackers            Bedside and Verbal shift change report given to Charles Cordoav RN (oncoming nurse) by Majo Pride Rn (offgoing nurse). Report included the following information SBAR, Kardex, ED Summary, Intake/Output, MAR, Recent Results, Med Rec Status and Cardiac Rhythm NSR.

## 2022-02-16 NOTE — PROGRESS NOTES
Cardiology Progress Note        Patient: Bayron Morin        Sex: male          DOA: 2/13/2022  YOB: 1965      Age:  49 y.o.        LOS:  LOS: 2 days      Patient seen and examined, chart reviewed. Assessment/Plan     Patient Active Problem List   Diagnosis Code    Hypoxia R09.02    Non-STEMI (non-ST elevated myocardial infarction) ([facility] I21.4    DM (diabetes mellitus) ([facility] E11.9    Pleural effusion J90    Chest pain M74.1    Systolic CHF, acute on chronic ([facility]) I50.23    COVID-19 U07.1      Current smoker    CT chest reported     1. No pulmonary thromboembolic disease. 2. Moderate to large right and moderate left pleural effusions. Additional regions of patchy groundglass opacity are also seen throughout both lower lobe basilar segments and throughout both perihilar upper lobes.  Indeterminant to what extent the regions of groundglass opacity representing alveolitis related to moderate central airway disease or instead represent an acute atypical  infection such as acute Covid-19 pneumonia.     Echocardiogram revealed    Image quality: adequate. Definity contrast was given to enhance imaging.   Left Ventricle: Left ventricle size is normal. Mildly increased wall thickness. Mild hypokinesis of the following segments: basal anterior, mid anterior, mid inferior, apical anterior and apical inferior. Moderate hypokinesis of the following segments: basal anterolateral, basal inferolateral, mid anterolateral, mid inferolateral and apical lateral. Mildly reduced left ventricular systolic function with a visually estimated EF of 35 - 40%. Grade II diastolic dysfunction with increased LAP.   Left Atrium: Left atrium is mildly dilated.   Mitral Valve: Mildly calcified leaflets. Mild mitral annular calcification. Mild to moderate transvalvular regurgitation. No stenosis.     Pulmonary artery : Estimated pulmonary arterial systolic pressure is 44 mhg. Pulmonary hypertension foun to be mild    Plan:    Continue aspirin and atorvastatin. IV heparin drip. Continue IV Lasix. Continue metoprolol tartrate 12.5 mg by mouth twice a day. Start Losartan 25 mg once a day  Will add Spironolactone as per blood pressure response. Salt restriction up to 2 g per day and fluid restriction up to 1.2 L per day  Patient is in droplet plus isolation   discussed with patient about ischemia workup. All risks, benefits and alternatives explained to patient and he verbally understood. He will discuss with his family member and let us know  Further cardiac workup as clinically indicated               Subjective:    cc: My breathing is better today, C/o cough with sputum production, denies any chest pain      REVIEW OF SYSTEMS:     General: No fevers or chills. Cardiovascular: No chest pain,No palpitations, No orthopnea, No PND, Positive leg swelling, No claudication  Pulmonary: No dyspnea. Positive Cough   Gastrointestinal: No nausea, vomiting, bleeding  Neurology: No Dizziness    Objective:      Visit Vitals  BP (!) 144/82   Pulse 64   Temp 97.8 °F (36.6 °C)   Resp 20   Ht 5' 8\" (1.727 m)   Wt 79.4 kg (175 lb)   SpO2 99%   BMI 26.61 kg/m²     Body mass index is 26.61 kg/m². Physical Exam:  General Appearance: Comfortable, not using accessory muscles of respiration. HEENT: EKATERINA. HEAD: Atraumatic  NECK: No JVD, no thyroidomeglay. CAROTIDS: No bruit  LUNGS: bibasilar absent air entry    HEART: S1+S2 audible, no murmur, no pericardial rub. ABD: Non-tender, BS Audible    EXT: Trace lower extremity edema, and no cyanosis. VASCULAR EXAM: Pulses are intact. PSYCHIATRIC EXAM: Mood is appropriate. MUSCULOSKELETAL: Grossly no joint deformity.   NEUROLOGICAL: AAO times 3, No motor and sensory deficit    Medication:  Current Facility-Administered Medications   Medication Dose Route Frequency    insulin lispro (HUMALOG) injection 3 Units  3 Units SubCUTAneous TIDAC  insulin glargine (LANTUS) injection 20 Units  20 Units SubCUTAneous QHS    [START ON 2/16/2022] nicotine (NICODERM CQ) 14 mg/24 hr patch 1 Patch  1 Patch TransDERmal DAILY    metoprolol tartrate (LOPRESSOR) tablet 12.5 mg  12.5 mg Oral Q12H    [START ON 2/16/2022] ascorbic acid (vitamin C) (VITAMIN C) tablet 500 mg  500 mg Oral DAILY    melatonin (rapid dissolve) tablet 5 mg  5 mg Oral QHS    [START ON 2/16/2022] zinc sulfate (ZINCATE) 50 mg zinc (220 mg) capsule 1 Capsule  1 Capsule Oral DAILY    [START ON 2/16/2022] cholecalciferol (VITAMIN D3) (1000 Units /25 mcg) tablet 2,000 Units  2,000 Units Oral DAILY    [START ON 2/16/2022] losartan (COZAAR) tablet 25 mg  25 mg Oral DAILY    buPROPion SR (WELLBUTRIN SR) tablet 100 mg  100 mg Oral BID    heparin 25,000 units in  ml infusion  12-25 Units/kg/hr IntraVENous TITRATE    glucagon (GLUCAGEN) injection 1 mg  1 mg IntraMUSCular PRN    glucose chewable tablet 16 g  16 g Oral PRN    insulin lispro (HUMALOG) injection   SubCUTAneous AC&HS    furosemide (LASIX) injection 40 mg  40 mg IntraVENous DAILY    aspirin delayed-release tablet 81 mg  81 mg Oral DAILY    atorvastatin (LIPITOR) tablet 40 mg  40 mg Oral QHS               Lab/Data Reviewed:       Recent Labs     02/15/22  0200 02/14/22  0215 02/13/22  0815   WBC 8.1 10.3 12.0   HGB 13.3 13.4 14.9   HCT 41.1 41.1 47.1    406 477*     Recent Labs     02/15/22  0200 02/14/22  0215 02/13/22  0815    144 140   K 4.1 3.3* 4.4    109 105   CO2 30 28 27   GLU 85 101* 387*   BUN 18 16 18   CREA 0.92 0.75 1.08   CA 8.8 8.7 9.1       Signed By: Juancarlos Villegas MD     February 15, 2022

## 2022-02-16 NOTE — DIABETES MGMT
Diabetes/ Glycemic Control Plan of Care  Recommendations:   Recommend decreasing Lantus to 14 units q 24 hrs. This is a 30% decrease in basal insulin    Assessment: Patient with low fasting blood sugar this morning of 55 mg/dL. Meal time insulin initiated yesterday at dinner which kept patient steady at 126 to 117 mg/dl. Patient would benefit from another decrease in basal insulin. Recent Glucose Results:   Lab Results   Component Value Date/Time    GLU 51 (LL) 02/16/2022 06:25 AM    GLUCPOC 55 (L) 02/16/2022 06:30 AM    GLUCPOC 54 (LL) 02/16/2022 06:24 AM    GLUCPOC 117 (H) 02/15/2022 09:18 PM        Within target range (70-180mg/dL): No  Current insulin orders: 20 units Lantus, 3 units Humalog AC,   Total Daily Dose previous 24 hours = 30 units (20 units Lantus + 10 units Humalog)     Plan/Goals:   Blood glucose will be within target of 70 - 180 mg/dl within 72 hours  Reinforce dietary and medication compliance at home.          Education:  [x] Refer to Diabetes Education Record                       [] Education not indicated at this time     Ethan Cerna RD  Glycemic Control Team  [phone]    Monday-Friday   9 am - 3 pm

## 2022-02-16 NOTE — PROGRESS NOTES
Hospitalist Progress Note    Patient: Selena Espinoza MRN: [id number]  CSN: [id number]    YOB: 1965  Age: 49 y.o. Sex: male    DOA: 2/13/2022 LOS:  LOS: 2 days                Assessment/Plan     Patient Active Problem List   Diagnosis Code    Hypoxia R09.02    Non-[facility]non[facility]21.4    DM (diabetes mellitus) ([facility] E11.9    Pleural effusion J90    Chest pain I43.6    Systolic CHF, acute on chronic ([facility] I50.23    COVID-19 U07.1        Chief complaint :  Chest pain, SOB    Not happy with positive COVID 19. Wants to repeat test.    Shortness of breath-  Likely secondary to CHF, pleural effusion  Started on diuresis  Follow-up chest x-ray with small pleural effusions.     CHF-  Echo with EF of 35-40%, grade 2 DD. Continue with diuresis      Pleural effusion-  Likely secondary to CHF  Diuresis as above     NSTEMI-  On heparin drip  Aspirin, statin  Further testing per cardiology.     DM-  Uncontrolled  on Lantus  Sliding-scale insulin  Diabetic diet. COVID 19 positive -  Not hypoxic  Antioxidant/vitamin cocktail    Discussed with patient about CHF, NSTEMI. Plan for cath. Disposition : 2-3 days    Review of systems  General: No fevers or chills. Cardiovascular: No chest pain or pressure. No palpitations. Pulmonary: No shortness of breath. Gastrointestinal: No nausea, vomiting. Physical Exam:  General: Awake, cooperative, no acute distress    HEENT: NC, Atraumatic. PERRLA, anicteric sclerae. Lungs: Decreased breath sounds lower lungs bilaterally. Heart:  S1 S2,  No murmur, No Rubs, No Gallops  Abdomen: Soft, Non distended, Non tender.  +Bowel sounds,   Extremities: No c/c/e  Psych:   Not anxious or agitated. Neurologic:  No acute neurological deficit.            Vital signs/Intake and Output:  Visit Vitals  /70   Pulse 68   Temp 97.8 °F (36.6 °C)   Resp 20   Ht 5' 8\" (1.727 m)   Wt 79.4 kg (175 lb)   SpO2 99%   BMI 26.61 kg/m²     Current Shift:  No intake/output data recorded. Last three shifts:  02/14 0701 - 02/15 1900  In: 900 [P.O.:900]  Out: 400 [Urine:400]            Labs: Results:       Chemistry Recent Labs     02/15/22  0200 02/14/22 0215 02/13/22  0815   GLU 85 101* 387*    144 140   K 4.1 3.3* 4.4    109 105   CO2 30 28 27   BUN 18 16 18   CREA 0.92 0.75 1.08   CA 8.8 8.7 9.1   AGAP 6 7 8   BUCR 20 21* 17   AP 96 95 131*   TP 5.4* 5.8* 6.5   ALB 2.6* 2.8* 3.4   GLOB 2.8 3.0 3.1   AGRAT 0.9 0.9 1.1      CBC w/Diff Recent Labs     02/15/22  0200 02/14/22  0215 02/13/22  0815   WBC 8.1 10.3 12.0   RBC 4.71 4.77 5.36   HGB 13.3 13.4 14.9   HCT 41.1 41.1 47.1    406 477*   GRANS 64 67 78*   LYMPH 24 23 13*   EOS 1 1 1      Cardiac Enzymes No results for input(s): CPK, CKND1, PETTY in the last 72 hours. No lab exists for component: CKRMB, TROIP   Coagulation Recent Labs     02/15/22  1555 02/15/22  0222 02/13/22  1755 02/13/22  0815   PTP  --   --   --  12.6   INR  --   --   --  1.0   APTT 54.6* 55.1*   < > 27.8    < > = values in this interval not displayed. Lipid Panel No results found for: CHOL, CHOLPOCT, CHOLX, CHLST, CHOLV, 025870, HDL, HDLP, LDL, LDLC, DLDLP, 539678, VLDLC, VLDL, TGLX, TRIGL, TRIGP, TGLPOCT, CHHD, CHHDX   BNP No results for input(s): BNPP in the last 72 hours.    Liver Enzymes Recent Labs     02/15/22  0200   TP 5.4*   ALB 2.6*   AP 96      Thyroid Studies No results found for: T4, T3U, TSH, TSHEXT, TSHEXT     Procedures/imaging: see electronic medical records for all procedures/Xrays and details which were not copied into this note but were reviewed prior to creation of Plan

## 2022-02-17 LAB
ALBUMIN SERPL-MCNC: 2.7 G/DL (ref 3.4–5)
ALBUMIN/GLOB SERPL: 0.9 {RATIO} (ref 0.8–1.7)
ALP SERPL-CCNC: 86 U/L (ref 45–117)
ALT SERPL-CCNC: 72 U/L (ref 16–61)
ANION GAP SERPL CALC-SCNC: 8 MMOL/L (ref 3–18)
APTT PPP: 93.7 SEC (ref 23–36.4)
AST SERPL-CCNC: 29 U/L (ref 10–38)
BASOPHILS # BLD: 0.1 K/UL (ref 0–0.1)
BASOPHILS NFR BLD: 1 % (ref 0–2)
BILIRUB SERPL-MCNC: 0.3 MG/DL (ref 0.2–1)
BUN SERPL-MCNC: 16 MG/DL (ref 7–18)
BUN/CREAT SERPL: 25 (ref 12–20)
CALCIUM SERPL-MCNC: 8.7 MG/DL (ref 8.5–10.1)
CHLORIDE SERPL-SCNC: 108 MMOL/L (ref 100–111)
CO2 SERPL-SCNC: 26 MMOL/L (ref 21–32)
CREAT SERPL-MCNC: 0.65 MG/DL (ref 0.6–1.3)
DIFFERENTIAL METHOD BLD: ABNORMAL
EOSINOPHIL # BLD: 0.1 K/UL (ref 0–0.4)
EOSINOPHIL NFR BLD: 1 % (ref 0–5)
ERYTHROCYTE [DISTWIDTH] IN BLOOD BY AUTOMATED COUNT: 14.5 % (ref 11.6–14.5)
GLOBULIN SER CALC-MCNC: 2.9 G/DL (ref 2–4)
GLUCOSE BLD STRIP.AUTO-MCNC: 102 MG/DL (ref 70–110)
GLUCOSE BLD STRIP.AUTO-MCNC: 119 MG/DL (ref 70–110)
GLUCOSE BLD STRIP.AUTO-MCNC: 258 MG/DL (ref 70–110)
GLUCOSE BLD STRIP.AUTO-MCNC: 71 MG/DL (ref 70–110)
GLUCOSE SERPL-MCNC: 93 MG/DL (ref 74–99)
HCT VFR BLD AUTO: 40.1 % (ref 36–48)
HGB BLD-MCNC: 12.9 G/DL (ref 13–16)
IMM GRANULOCYTES # BLD AUTO: 0 K/UL (ref 0–0.04)
IMM GRANULOCYTES NFR BLD AUTO: 1 % (ref 0–0.5)
INR PPP: 1 (ref 0.8–1.2)
LYMPHOCYTES # BLD: 1.7 K/UL (ref 0.9–3.6)
LYMPHOCYTES NFR BLD: 26 % (ref 21–52)
MCH RBC QN AUTO: 27.9 PG (ref 24–34)
MCHC RBC AUTO-ENTMCNC: 32.2 G/DL (ref 31–37)
MCV RBC AUTO: 86.8 FL (ref 78–100)
MONOCYTES # BLD: 1 K/UL (ref 0.05–1.2)
MONOCYTES NFR BLD: 15 % (ref 3–10)
NEUTS SEG # BLD: 3.8 K/UL (ref 1.8–8)
NEUTS SEG NFR BLD: 57 % (ref 40–73)
NRBC # BLD: 0 K/UL (ref 0–0.01)
NRBC BLD-RTO: 0 PER 100 WBC
PLATELET # BLD AUTO: 372 K/UL (ref 135–420)
PMV BLD AUTO: 10.5 FL (ref 9.2–11.8)
POTASSIUM SERPL-SCNC: 3.6 MMOL/L (ref 3.5–5.5)
PROT SERPL-MCNC: 5.6 G/DL (ref 6.4–8.2)
PROTHROMBIN TIME: 12.5 SEC (ref 11.5–15.2)
RBC # BLD AUTO: 4.62 M/UL (ref 4.35–5.65)
SODIUM SERPL-SCNC: 142 MMOL/L (ref 136–145)
WBC # BLD AUTO: 6.7 K/UL (ref 4.6–13.2)

## 2022-02-17 PROCEDURE — 85730 THROMBOPLASTIN TIME PARTIAL: CPT

## 2022-02-17 PROCEDURE — 93458 L HRT ARTERY/VENTRICLE ANGIO: CPT | Performed by: INTERNAL MEDICINE

## 2022-02-17 PROCEDURE — 74011636637 HC RX REV CODE- 636/637: Performed by: HOSPITALIST

## 2022-02-17 PROCEDURE — 65660000000 HC RM CCU STEPDOWN

## 2022-02-17 PROCEDURE — C1894 INTRO/SHEATH, NON-LASER: HCPCS | Performed by: INTERNAL MEDICINE

## 2022-02-17 PROCEDURE — 85610 PROTHROMBIN TIME: CPT

## 2022-02-17 PROCEDURE — 74011250636 HC RX REV CODE- 250/636: Performed by: HOSPITALIST

## 2022-02-17 PROCEDURE — C1769 GUIDE WIRE: HCPCS | Performed by: INTERNAL MEDICINE

## 2022-02-17 PROCEDURE — 77030004522 HC CATH ANGI DX EXPO BSC -A: Performed by: INTERNAL MEDICINE

## 2022-02-17 PROCEDURE — 36415 COLL VENOUS BLD VENIPUNCTURE: CPT

## 2022-02-17 PROCEDURE — 4A023N7 MEASUREMENT OF CARDIAC SAMPLING AND PRESSURE, LEFT HEART, PERCUTANEOUS APPROACH: ICD-10-PCS | Performed by: INTERNAL MEDICINE

## 2022-02-17 PROCEDURE — 99153 MOD SED SAME PHYS/QHP EA: CPT | Performed by: INTERNAL MEDICINE

## 2022-02-17 PROCEDURE — 77030010221 HC SPLNT WR POS TELE -B: Performed by: INTERNAL MEDICINE

## 2022-02-17 PROCEDURE — 74011250636 HC RX REV CODE- 250/636: Performed by: INTERNAL MEDICINE

## 2022-02-17 PROCEDURE — 99152 MOD SED SAME PHYS/QHP 5/>YRS: CPT | Performed by: INTERNAL MEDICINE

## 2022-02-17 PROCEDURE — 77030008543 HC TBNG MON PRSS MRTM -A: Performed by: INTERNAL MEDICINE

## 2022-02-17 PROCEDURE — 74011000636 HC RX REV CODE- 636: Performed by: INTERNAL MEDICINE

## 2022-02-17 PROCEDURE — 74011250637 HC RX REV CODE- 250/637: Performed by: INTERNAL MEDICINE

## 2022-02-17 PROCEDURE — 74011250637 HC RX REV CODE- 250/637: Performed by: HOSPITALIST

## 2022-02-17 PROCEDURE — 82962 GLUCOSE BLOOD TEST: CPT

## 2022-02-17 PROCEDURE — 85025 COMPLETE CBC W/AUTO DIFF WBC: CPT

## 2022-02-17 PROCEDURE — 74011000250 HC RX REV CODE- 250: Performed by: HOSPITALIST

## 2022-02-17 PROCEDURE — 74011000250 HC RX REV CODE- 250: Performed by: INTERNAL MEDICINE

## 2022-02-17 PROCEDURE — 80053 COMPREHEN METABOLIC PANEL: CPT

## 2022-02-17 PROCEDURE — 77030013797 HC KT TRNSDUC PRSSR EDWD -A: Performed by: INTERNAL MEDICINE

## 2022-02-17 PROCEDURE — 77010033678 HC OXYGEN DAILY

## 2022-02-17 PROCEDURE — B2111ZZ FLUOROSCOPY OF MULTIPLE CORONARY ARTERIES USING LOW OSMOLAR CONTRAST: ICD-10-PCS | Performed by: INTERNAL MEDICINE

## 2022-02-17 PROCEDURE — 74011250636 HC RX REV CODE- 250/636: Performed by: EMERGENCY MEDICINE

## 2022-02-17 RX ORDER — DEXTROSE MONOHYDRATE 100 MG/ML
125-250 INJECTION, SOLUTION INTRAVENOUS AS NEEDED
Status: DISCONTINUED | OUTPATIENT
Start: 2022-02-17 | End: 2022-02-18 | Stop reason: HOSPADM

## 2022-02-17 RX ORDER — HEPARIN SODIUM 200 [USP'U]/100ML
INJECTION, SOLUTION INTRAVENOUS
Status: COMPLETED | OUTPATIENT
Start: 2022-02-17 | End: 2022-02-17

## 2022-02-17 RX ORDER — SODIUM CHLORIDE 0.9 % (FLUSH) 0.9 %
5-40 SYRINGE (ML) INJECTION AS NEEDED
Status: DISCONTINUED | OUTPATIENT
Start: 2022-02-17 | End: 2022-02-18 | Stop reason: HOSPADM

## 2022-02-17 RX ORDER — LIDOCAINE HYDROCHLORIDE 10 MG/ML
INJECTION INFILTRATION; PERINEURAL AS NEEDED
Status: DISCONTINUED | OUTPATIENT
Start: 2022-02-17 | End: 2022-02-17 | Stop reason: HOSPADM

## 2022-02-17 RX ORDER — FENTANYL CITRATE 50 UG/ML
INJECTION, SOLUTION INTRAMUSCULAR; INTRAVENOUS AS NEEDED
Status: DISCONTINUED | OUTPATIENT
Start: 2022-02-17 | End: 2022-02-17 | Stop reason: HOSPADM

## 2022-02-17 RX ORDER — SODIUM CHLORIDE 0.9 % (FLUSH) 0.9 %
5-40 SYRINGE (ML) INJECTION EVERY 8 HOURS
Status: DISCONTINUED | OUTPATIENT
Start: 2022-02-17 | End: 2022-02-18 | Stop reason: HOSPADM

## 2022-02-17 RX ORDER — HEPARIN SODIUM 1000 [USP'U]/ML
INJECTION, SOLUTION INTRAVENOUS; SUBCUTANEOUS AS NEEDED
Status: DISCONTINUED | OUTPATIENT
Start: 2022-02-17 | End: 2022-02-17 | Stop reason: HOSPADM

## 2022-02-17 RX ORDER — VERAPAMIL HYDROCHLORIDE 2.5 MG/ML
INJECTION, SOLUTION INTRAVENOUS AS NEEDED
Status: DISCONTINUED | OUTPATIENT
Start: 2022-02-17 | End: 2022-02-17 | Stop reason: HOSPADM

## 2022-02-17 RX ADMIN — INSULIN GLARGINE 10 UNITS: 100 INJECTION, SOLUTION SUBCUTANEOUS at 21:01

## 2022-02-17 RX ADMIN — Medication 3 UNITS: at 16:30

## 2022-02-17 RX ADMIN — BUPROPION HYDROCHLORIDE 100 MG: 100 TABLET, EXTENDED RELEASE ORAL at 20:55

## 2022-02-17 RX ADMIN — ASPIRIN 81 MG: 81 TABLET, COATED ORAL at 09:00

## 2022-02-17 RX ADMIN — Medication 500 MG: at 09:00

## 2022-02-17 RX ADMIN — HEPARIN SODIUM 22 UNITS/KG/HR: 5000 INJECTION INTRAVENOUS; SUBCUTANEOUS at 09:59

## 2022-02-17 RX ADMIN — METOPROLOL TARTRATE 12.5 MG: 25 TABLET, FILM COATED ORAL at 09:39

## 2022-02-17 RX ADMIN — METOPROLOL TARTRATE 12.5 MG: 25 TABLET, FILM COATED ORAL at 20:55

## 2022-02-17 RX ADMIN — ZINC SULFATE 220 MG (50 MG) CAPSULE 1 CAPSULE: CAPSULE at 09:39

## 2022-02-17 RX ADMIN — ATORVASTATIN CALCIUM 40 MG: 20 TABLET, FILM COATED ORAL at 21:04

## 2022-02-17 RX ADMIN — FUROSEMIDE 40 MG: 10 INJECTION, SOLUTION INTRAMUSCULAR; INTRAVENOUS at 09:00

## 2022-02-17 RX ADMIN — SODIUM CHLORIDE, PRESERVATIVE FREE 10 ML: 5 INJECTION INTRAVENOUS at 18:00

## 2022-02-17 RX ADMIN — BUPROPION HYDROCHLORIDE 100 MG: 100 TABLET, EXTENDED RELEASE ORAL at 09:38

## 2022-02-17 RX ADMIN — LOSARTAN POTASSIUM 12.5 MG: 25 TABLET, FILM COATED ORAL at 09:39

## 2022-02-17 RX ADMIN — DEXTROSE MONOHYDRATE 250 ML: 100 INJECTION, SOLUTION INTRAVENOUS at 12:27

## 2022-02-17 RX ADMIN — Medication 5 MG: at 21:01

## 2022-02-17 RX ADMIN — Medication 2000 UNITS: at 09:38

## 2022-02-17 NOTE — PROGRESS NOTES
Cardiology Progress Note        Patient: Radha Tirado        Sex: male          DOA: 2/13/2022  YOB: 1965      Age:  49 y.o.        LOS:  LOS: 3 days      Patient seen and examined, chart reviewed. Assessment/Plan     Patient Active Problem List   Diagnosis Code    Hypoxia R09.02    Non-STEMI (non-ST elevated myocardial infarction) [facility] I21.4    DM (diabetes mellitus) ([facility] E11.9    Pleural effusion J90    Chest pain B28.5    Systolic CHF, acute M08.16    COVID-19 U07.1      Current smoker    CT chest reported     1. No pulmonary thromboembolic disease. 2. Moderate to large right and moderate left pleural effusions. Additional regions of patchy groundglass opacity are also seen throughout both lower lobe basilar segments and throughout both perihilar upper lobes.  Indeterminant to what extent the regions of groundglass opacity representing alveolitis related to moderate central airway disease or instead represent an acute atypical  infection such as acute Covid-19 pneumonia.     Echocardiogram revealed    Image quality: adequate. Definity contrast was given to enhance imaging.   Left Ventricle: Left ventricle size is normal. Mildly increased wall thickness. Mild hypokinesis of the following segments: basal anterior, mid anterior, mid inferior, apical anterior and apical inferior. Moderate hypokinesis of the following segments: basal anterolateral, basal inferolateral, mid anterolateral, mid inferolateral and apical lateral. Mildly reduced left ventricular systolic function with a visually estimated EF of 35 - 40%. Grade II diastolic dysfunction with increased LAP.   Left Atrium: Left atrium is mildly dilated.   Mitral Valve: Mildly calcified leaflets. Mild mitral annular calcification. Mild to moderate transvalvular regurgitation. No stenosis.     Pulmonary artery : Estimated pulmonary arterial systolic pressure is 44 mhg.  Pulmonary hypertension foun to be mild    Plan:    Continue aspirin and atorvastatin. IV heparin drip. Continue IV Lasix. Continue metoprolol tartrate 12.5 mg by mouth twice a day. Change Losartan to 12.5 mg once a day  Will add Spironolactone as per blood pressure response. Salt restriction up to 2 g per day and fluid restriction up to 1.2 L per day  Patient is in droplet plus isolation   in view of non-STEMI with newly diagnosed systolic heart failure, advised about left heart catheterization, coronary angiogram plus or minus PCI. All risks, benefits and alternatives explained to patient and he verbally understood. Also called patient's wife and discussed about patient's condition and various treatment options. Discussed with patient about risk of cardiac catheterization including not limited are hematoma, retroperitoneal bleed, pseudoaneurysm, AV fistula, dissection, thrombosis and embolism, radial artery occlusion, myocardial infarction, CVA, TIA, dissection and perforation of great vessels, cardiac perforation, tamponade, atheroembolism, allergy reaction, infection, acute renal failure, arrhythmias, radiation injury, congestive heart failure, respiratory failure, multiorgan failure, death. Patient agreed for procedure. NPO after midnight              Subjective:    cc: My breathing is better today, Denies any chest pain      REVIEW OF SYSTEMS:     General: No fevers or chills. Cardiovascular: No chest pain,No palpitations, No orthopnea, No PND,  no leg swelling, No claudication  Pulmonary: No dyspnea. Positive Cough   Gastrointestinal: No nausea, vomiting, bleeding  Neurology: No Dizziness    Objective:      Visit Vitals  BP (!) 110/55 (BP 1 Location: Left upper arm, BP Patient Position: Sitting)   Pulse (!) 59   Temp 97.3 °F (36.3 °C)   Resp 20   Ht 5' 8\" (1.727 m)   Wt 79.4 kg (175 lb)   SpO2 100%   BMI 26.61 kg/m²     Body mass index is 26.61 kg/m².     Physical Exam:  General Appearance: Comfortable, not using accessory muscles of respiration. HEENT: EKATERINA. HEAD: Atraumatic  NECK: No JVD, no thyroidomeglay. CAROTIDS: No bruit  LUNGS: bibasilar absent air entry    HEART: S1+S2 audible, no murmur, no pericardial rub. ABD: Non-tender, BS Audible    EXT:   absent lower extremity edema, and no cyanosis. VASCULAR EXAM: Pulses are intact. PSYCHIATRIC EXAM: Mood is appropriate. MUSCULOSKELETAL: Grossly no joint deformity.   NEUROLOGICAL: AAO times 3, No motor and sensory deficit    Medication:  Current Facility-Administered Medications   Medication Dose Route Frequency    insulin glargine (LANTUS) injection 10 Units  10 Units SubCUTAneous QHS    [START ON 2/17/2022] losartan (COZAAR) tablet 12.5 mg  12.5 mg Oral DAILY    insulin lispro (HUMALOG) injection 3 Units  3 Units SubCUTAneous TIDAC    nicotine (NICODERM CQ) 14 mg/24 hr patch 1 Patch  1 Patch TransDERmal DAILY    metoprolol tartrate (LOPRESSOR) tablet 12.5 mg  12.5 mg Oral Q12H    ascorbic acid (vitamin C) (VITAMIN C) tablet 500 mg  500 mg Oral DAILY    melatonin (rapid dissolve) tablet 5 mg  5 mg Oral QHS    zinc sulfate (ZINCATE) 50 mg zinc (220 mg) capsule 1 Capsule  1 Capsule Oral DAILY    cholecalciferol (VITAMIN D3) (1000 Units /25 mcg) tablet 2,000 Units  2,000 Units Oral DAILY    buPROPion SR (WELLBUTRIN SR) tablet 100 mg  100 mg Oral BID    heparin 25,000 units in  ml infusion  12-25 Units/kg/hr IntraVENous TITRATE    glucagon (GLUCAGEN) injection 1 mg  1 mg IntraMUSCular PRN    glucose chewable tablet 16 g  16 g Oral PRN    insulin lispro (HUMALOG) injection   SubCUTAneous AC&HS    furosemide (LASIX) injection 40 mg  40 mg IntraVENous DAILY    aspirin delayed-release tablet 81 mg  81 mg Oral DAILY    atorvastatin (LIPITOR) tablet 40 mg  40 mg Oral QHS               Lab/Data Reviewed:       Recent Labs     02/16/22  0625 02/15/22  0200 02/14/22  0215   WBC 8.7 8.1 10.3   HGB 14.1 13.3 13.4   HCT 43.0 41.1 41.1    398 406     Recent Labs     02/16/22  0625 02/15/22  0200 02/14/22  0215    142 144   K 3.5 4.1 3.3*    106 109   CO2 26 30 28   GLU 51* 85 101*   BUN 16 18 16   CREA 0.72 0.92 0.75   CA 8.7 8.8 8.7     Total time spent 45 minutes    Signed By: Pamella Martin MD     February 16, 2022

## 2022-02-17 NOTE — PROGRESS NOTES
Physician Progress Note      PATIENT:               Maddy Mojica  CSN #:                  [id number]  :                       1965  ADMIT DATE:       2022 8:04 AM  DISCH DATE:  RESPONDING  PROVIDER #:        Hema Paige MD          QUERY TEXT:    Patient admitted with Chest pain, SOB. Patient with recent Covid infection and noted positive Covid test on . If possible, please document in progress notes and discharge summary if patient has an active Covid-19 infection or if patient is testing positive for Covid-19 without a current active infection: The medical record reflects the following:    Risk Factors: Recent positive COVID, DM, CHF, Old MI    Clinical Indicators:  > COVID detected   > COVID + 22  > Per H&P- Shortness of breath- Likely secondary to CHF, pleural effusion  He had mild symptoms, he quarantine for 5 days and return to work. He has been feeling better for the past 1 week. He has been doing fine until yesterday. He went out and worked in the yard yesterday morning. Last night when he went to sleep he had some shortness of breath, chest pain and cough. Treatment: receiving  COVID rapid test, Chest x-ray,    Thank you,  Neomi Bosworth, RN, BSN, CRCR  [email or web address]  Options provided:  -- Active Covid-19 infection is being treated and/or evaluated on current encounter  -- Positive Covid test result without an active current Covid-19 infection  -- Other - I will add my own diagnosis  -- Disagree - Not applicable / Not valid  -- Disagree - Clinically unable to determine / Unknown  -- Refer to Clinical Documentation Reviewer    PROVIDER RESPONSE TEXT:    Patient is testing positive for Covid without an active Covid-19 infection.     Query created by: Abel Leger on 2/15/2022 10:57 AM      Electronically signed by:  Hema Paige MD 2022 8:26 PM

## 2022-02-17 NOTE — PROGRESS NOTES
Hospitalist Progress Note    Patient: Alric Koyanagi MRN: [id number]  CSN: [id number]    YOB: 1965  Age: 49 y.o. Sex: male    DOA: 2/13/2022 LOS:  LOS: 3 days                Assessment/Plan     Patient Active Problem List   Diagnosis Code    Hypoxia R09.02    Non-STEMI (non-ST elevated myocardial infarction) ([facility]) I21.4    DM (diabetes mellitus) ([facility] [address] E11.9    Pleural effusion J90    Chest pain H69.1    Systolic CHF, acute on chronic (HCC) I50.23    COVID-19 U07.1        Chief complaint :  Chest pain, SOB    Shortness of breath-  resolved  Likely secondary to CHF, pleural effusion  Started on diuresis  Follow-up chest x-ray with small pleural effusions.     CHF-  Echo with EF of 35-40%, grade 2 DD. Continue with diuresis      Pleural effusion-  Likely secondary to CHF  Diuresis as above     NSTEMI-  On heparin drip  Aspirin, statin  Patient agrees with cath     DM-  Uncontrolled  on Lantus  Sliding-scale insulin  Diabetic diet. COVID 19 positive -  Not hypoxic  Antioxidant/vitamin cocktail    Discussed with patient about CHF, NSTEMI. Plan for cath. Disposition : 2-3 days    Review of systems  General: No fevers or chills. Cardiovascular: No chest pain or pressure. No palpitations. Pulmonary: No shortness of breath. Gastrointestinal: No nausea, vomiting. Physical Exam:  General: Awake, cooperative, no acute distress    HEENT: NC, Atraumatic. PERRLA, anicteric sclerae. Lungs: Decreased breath sounds lower lungs bilaterally. Heart:  S1 S2,  No murmur, No Rubs, No Gallops  Abdomen: Soft, Non distended, Non tender.  +Bowel sounds,   Extremities: No c/c/e  Psych:   Not anxious or agitated. Neurologic:  No acute neurological deficit.            Vital signs/Intake and Output:  Visit Vitals  BP (!) 110/55 (BP 1 Location: Left upper arm, BP Patient Position: Sitting)   Pulse (!) 59   Temp 97.3 °F (36.3 °C)   Resp 20   Ht 5' 8\" (1.727 m)   Wt 79.4 kg (175 lb)   SpO2 100% BMI 26.61 kg/m²     Current Shift:  No intake/output data recorded. Last three shifts:  02/15 0701 - 02/16 1900  In: 2530 [P.O.:5140]  Out: 400 [Urine:400]            Labs: Results:       Chemistry Recent Labs     02/16/22  0625 02/15/22  0200 02/14/22  0215   GLU 51* 85 101*    142 144   K 3.5 4.1 3.3*    106 109   CO2 26 30 28   BUN 16 18 16   CREA 0.72 0.92 0.75   CA 8.7 8.8 8.7   AGAP 7 6 7   BUCR 22* 20 21*   AP 93 96 95   TP 6.4 5.4* 5.8*   ALB 2.8* 2.6* 2.8*   GLOB 3.6 2.8 3.0   AGRAT 0.8 0.9 0.9      CBC w/Diff Recent Labs     02/16/22  0625 02/15/22  0200 02/14/22  0215   WBC 8.7 8.1 10.3   RBC 4.90 4.71 4.77   HGB 14.1 13.3 13.4   HCT 43.0 41.1 41.1    398 406   GRANS  --  64 67   LYMPH  --  24 23   EOS  --  1 1      Cardiac Enzymes No results for input(s): CPK, CKND1, PETTY in the last 72 hours. No lab exists for component: CKRMB, TROIP   Coagulation Recent Labs     02/16/22  0625 02/15/22  2239   APTT 107.6* 157.0*       Lipid Panel No results found for: CHOL, CHOLPOCT, CHOLX, CHLST, CHOLV, 966138, HDL, HDLP, LDL, LDLC, DLDLP, 709622, VLDLC, VLDL, TGLX, TRIGL, TRIGP, TGLPOCT, CHHD, CHHDX   BNP No results for input(s): BNPP in the last 72 hours.    Liver Enzymes Recent Labs     02/16/22 0625   TP 6.4   ALB 2.8*   AP 93      Thyroid Studies No results found for: T4, T3U, TSH, TSHEXT, TSHEXT     Procedures/imaging: see electronic medical records for all procedures/Xrays and details which were not copied into this note but were reviewed prior to creation of Plan

## 2022-02-17 NOTE — PROGRESS NOTES
Patient has been off unit for cardiac catheterization, will follow up for care management needs in a.m.     Care Management Interventions  Palliative Care Criteria Met (RRAT>21 & CHF Dx)?: No  Support Systems: Spouse/Significant Other  Confirm Follow Up Transport: Family  Discharge Location  Patient Expects to be Discharged to[de-identified] Home

## 2022-02-17 NOTE — H&P
Date of Surgery Update:  Kami Hood was seen and examined. History and physical has been reviewed. The patient has been examined. There have been no significant clinical changes since the completion of the originally dated History and Physical.      Patient assessed and is candidate for moderate sedation.       Signed By: Sunshine Singh MD     February 17, 2022 3:13 PM

## 2022-02-17 NOTE — PROGRESS NOTES
Pt back to care unit S/P cardiac cath. Pt awake and alert and tolerated procedure well. Tr band to left wrist with immobilizer in place. No bleeding nor hematoma to site. Precautions reinforced.

## 2022-02-17 NOTE — PROGRESS NOTES
0700  Received bedside report from Pina Cerrato, [address] [place] [address]. Report included KARDEX, SBAR, med rec, and lab. s

## 2022-02-17 NOTE — PROGRESS NOTES
Bedside and Verbal shift change report given to Navdeep Tate RN (oncoming nurse) by Ned Toussaint RN (offgoing nurse). Report included the following information SBAR, Kardex, ED Summary, Intake/Output, MAR, Recent Results, Med Rec Status and Cardiac Rhythm NSR.       1953  Shift assessment complete  Pt resting quietly with eyes open and chest rising and falling evenly   No c/o pain or signs of distress  Bed locked and in lowest position   Call light within [address] Cardiac/Medical Night Shift Chart Audit    Chart Audit completed? YES        Bedside and Verbal shift change report given to Sukhdeep Kiran RN (oncoming nurse) by Navdeep Tate RN (offgoing nurse). Report included the following information SBAR, Kardex, ED Summary, OR Summary, Intake/Output, MAR, Recent Results, Med Rec Status and Cardiac Rhythm NSR.

## 2022-02-17 NOTE — PROGRESS NOTES
Problem: Falls - Risk of  Goal: *Absence of Falls  Description: Document Susan Givens Fall Risk and appropriate interventions in the flowsheet. Outcome: Progressing Towards Goal  Variance Patient Condition  Note: Fall Risk Interventions:  Mobility Interventions: Assess mobility with egress test         Medication Interventions: Teach patient to arise slowly                   Problem: Patient Education: Go to Patient Education Activity  Goal: Patient/Family Education  Outcome: Progressing Towards Goal     Problem: Airway Clearance - Ineffective  Goal: Achieve or maintain patent airway  Outcome: Progressing Towards Goal     Problem: Gas Exchange - Impaired  Goal: Absence of hypoxia  Outcome: Progressing Towards Goal  Goal: Promote optimal lung function  Outcome: Progressing Towards Goal     Problem: Breathing Pattern - Ineffective  Goal: Ability to achieve and maintain a regular respiratory rate  Outcome: Progressing Towards Goal     Problem:  Body Temperature -  Risk of, Imbalanced  Goal: Ability to maintain a body temperature within defined limits  Outcome: Progressing Towards Goal  Goal: Will regain or maintain usual level of consciousness  Outcome: Progressing Towards Goal  Goal: Complications related to the disease process, condition or treatment will be avoided or minimized  Outcome: Progressing Towards Goal     Problem: Isolation Precautions - Risk of Spread of Infection  Goal: Prevent transmission of infectious organism to others  Outcome: Progressing Towards Goal     Problem: Nutrition Deficits  Goal: Optimize nutrtional status  Outcome: Progressing Towards Goal     Problem: Risk for Fluid Volume Deficit  Goal: Maintain normal heart rhythm  Outcome: Progressing Towards Goal  Goal: Maintain absence of muscle cramping  Outcome: Progressing Towards Goal  Goal: Maintain normal serum potassium, sodium, calcium, phosphorus, and pH  Outcome: Progressing Towards Goal     Problem: Loneliness or Risk for Loneliness  Goal: Demonstrate positive use of time alone when socialization is not possible  Outcome: Progressing Towards Goal     Problem: Fatigue  Goal: Verbalize increase energy and improved vitality  Outcome: Progressing Towards Goal     Problem: Patient Education: Go to Patient Education Activity  Goal: Patient/Family Education  Outcome: Progressing Towards Goal     Problem: Diabetes Self-Management  Goal: *Disease process and treatment process  Description: Define diabetes and identify own type of diabetes; list 3 options for treating diabetes. Outcome: Progressing Towards Goal  Goal: *Incorporating nutritional management into lifestyle  Description: Describe effect of type, amount and timing of food on blood glucose; list 3 methods for planning meals. Outcome: Progressing Towards Goal  Goal: *Incorporating physical activity into lifestyle  Description: State effect of exercise on blood glucose levels. Outcome: Progressing Towards Goal  Goal: *Developing strategies to promote health/change behavior  Description: Define the ABC's of diabetes; identify appropriate screenings, schedule and personal plan for screenings. Outcome: Progressing Towards Goal  Goal: *Using medications safely  Description: State effect of diabetes medications on diabetes; name diabetes medication taking, action and side effects. Outcome: Progressing Towards Goal  Goal: *Monitoring blood glucose, interpreting and using results  Description: Identify recommended blood glucose targets  and personal targets. Outcome: Progressing Towards Goal  Goal: *Prevention, detection, treatment of acute complications  Description: List symptoms of hyper- and hypoglycemia; describe how to treat low blood sugar and actions for lowering  high blood glucose level.   Outcome: Progressing Towards Goal  Goal: *Prevention, detection and treatment of chronic complications  Description: Define the natural course of diabetes and describe the relationship of blood glucose levels to long term complications of diabetes. Outcome: Progressing Towards Goal  Goal: *Developing strategies to address psychosocial issues  Description: Describe feelings about living with diabetes; identify support needed and support network  Outcome: Progressing Towards Goal  Goal: *Insulin pump training  Outcome: Progressing Towards Goal  Goal: *Sick day guidelines  Outcome: Progressing Towards Goal  Goal: *Patient Specific Goal (EDIT GOAL, INSERT TEXT)  Outcome: Progressing Towards Goal     Problem: Patient Education: Go to Patient Education Activity  Goal: Patient/Family Education  Outcome: Progressing Towards Goal     Problem:  Moderate Sedation (Adult)  Goal: *Patent airway  Outcome: Progressing Towards Goal  Goal: *Adequate oxygenation  Outcome: Progressing Towards Goal  Goal: *Absence of aspiration  Outcome: Progressing Towards Goal  Goal: *Hemodynamically stable  Outcome: Progressing Towards Goal  Goal: *Optimal pain control at patient's stated goal  Outcome: Progressing Towards Goal  Goal: *Absence of nausea/vomiting  Outcome: Progressing Towards Goal  Goal: *Anxiety reduced or absent  Outcome: Progressing Towards Goal  Goal: *Absence of injury  Outcome: Progressing Towards Goal  Goal: *Level of consciousness returns to baseline  Outcome: Progressing Towards Goal  Goal: Interventions  Outcome: Progressing Towards Goal     Problem: Patient Education: Go to Patient Education Activity  Goal: Patient/Family Education  Outcome: Progressing Towards Goal     Problem: Patient Education: Go to Patient Education Activity  Goal: Patient/Family Education  Outcome: Progressing Towards Goal     Problem: Cath Lab Procedures: Pre-Procedure  Goal: Off Pathway (Use only if patient is Off Pathway)  Outcome: Progressing Towards Goal  Goal: Activity/Safety  Outcome: Progressing Towards Goal  Goal: Consults, if ordered  Outcome: Progressing Towards Goal  Goal: Diagnostic Test/Procedures  Outcome: Progressing Towards Goal  Goal: Nutrition/Diet  Outcome: Progressing Towards Goal  Goal: Discharge Planning  Outcome: Progressing Towards Goal  Goal: Medications  Outcome: Progressing Towards Goal  Goal: Respiratory  Outcome: Progressing Towards Goal  Goal: Treatments/Interventions/Procedures  Outcome: Progressing Towards Goal  Goal: Psychosocial  Outcome: Progressing Towards Goal  Goal: *Verbalize description of procedure  Outcome: Progressing Towards Goal  Goal: *Consent signed  Outcome: Progressing Towards Goal     Problem: Cath Lab Procedures: Post-Cath Day of Procedure (Initiate SCIP Measures for Post-Op Care)  Goal: Off Pathway (Use only if patient is Off Pathway)  Outcome: Progressing Towards Goal  Goal: Activity/Safety  Outcome: Progressing Towards Goal  Goal: Consults, if ordered  Outcome: Progressing Towards Goal  Goal: Diagnostic Test/Procedures  Outcome: Progressing Towards Goal  Goal: Nutrition/Diet  Outcome: Progressing Towards Goal  Goal: Discharge Planning  Outcome: Progressing Towards Goal  Goal: Medications  Outcome: Progressing Towards Goal  Goal: Respiratory  Outcome: Progressing Towards Goal  Goal: Treatments/Interventions/Procedures  Outcome: Progressing Towards Goal  Goal: Psychosocial  Outcome: Progressing Towards Goal  Goal: *Procedure site is without bleeding and signs of infection six hours post sheath removal  Outcome: Progressing Towards Goal  Goal: *Hemodynamically stable  Outcome: Progressing Towards Goal  Goal: *Optimal pain control at patient's stated goal  Outcome: Progressing Towards Goal     Problem: Cath Lab Procedures: Post-Cath Day 1  Goal: Off Pathway (Use only if patient is Off Pathway)  Outcome: Progressing Towards Goal  Goal: Activity/Safety  Outcome: Progressing Towards Goal  Goal: Diagnostic Test/Procedures  Outcome: Progressing Towards Goal  Goal: Nutrition/Diet  Outcome: Progressing Towards Goal  Goal: Discharge Planning  Outcome: Progressing Towards Goal  Goal: Medications  Outcome: Progressing Towards Goal  Goal: Respiratory  Outcome: Progressing Towards Goal  Goal: Treatments/Interventions/Procedures  Outcome: Progressing Towards Goal  Goal: Psychosocial  Outcome: Progressing Towards Goal     Problem: Cath Lab Procedures: Discharge Outcomes  Goal: *Stable cardiac rhythm  Outcome: Progressing Towards Goal  Goal: *Hemodynamically stable  Outcome: Progressing Towards Goal  Goal: *Optimal pain control at patient's stated goal  Outcome: Progressing Towards Goal  Goal: *Pulses palpable, skin color within defined limits, skin temperature warm  Outcome: Progressing Towards Goal  Goal: *Lungs clear or at baseline  Outcome: Progressing Towards Goal  Goal: *Demonstrates ability to perform prescribed activity without shortness of breath or discomfort  Outcome: Progressing Towards Goal  Goal: *Verbalizes home exercise program, activity guidelines, cardiac precautions  Outcome: Progressing Towards Goal  Goal: *Verbalizes understanding and describes prescribed diet  Outcome: Progressing Towards Goal  Goal: *Verbalizes understanding and describes medication purposes and frequencies  Outcome: Progressing Towards Goal  Goal: *Identifies cardiac risk factors  Outcome: Progressing Towards Goal  Goal: *No signs and symptoms of infection or wound complications  Outcome: Progressing Towards Goal  Goal: *Anxiety reduced or absent  Outcome: Progressing Towards Goal  Goal: *Verbalizes and demonstrates incision care  Outcome: Progressing Towards Goal  Goal: *Understands and describes signs and symptoms to report to providers(Stroke Metric)  Outcome: Progressing Towards Goal  Goal: *Describes follow-up/return visits to physicians  Outcome: Progressing Towards Goal  Goal: *Describes available resources and support systems  Outcome: Progressing Towards Goal  Goal: *Influenza immunization  Outcome: Progressing Towards Goal  Goal: *Pneumococcal immunization  Outcome: Progressing Towards Goal

## 2022-02-17 NOTE — PROCEDURES
LHC and Coronary angiogram done via left radial approach. Coronary angiogram revealed critical triple vessel disease. LV gram was not done. LVEDP 20 mm hg. No significant gradient on pull back. Patient tolerated procedure well. Scant blood loss. No complications. No specimen removed. Recommendation:    Medication considered:   Aspirin, beta blocker, ACEI, Nitrates and statin   Restart IV heparin after 4 hours     CAD risk factor education  Diet education  Control cholesterol  Blood pressure control  Exercise education: Age and functional status appropriate.   Strongly advised to quit smoking

## 2022-02-18 VITALS
HEIGHT: 68 IN | RESPIRATION RATE: 20 BRPM | OXYGEN SATURATION: 98 % | WEIGHT: 173.94 LBS | HEART RATE: 60 BPM | SYSTOLIC BLOOD PRESSURE: 116 MMHG | BODY MASS INDEX: 26.36 KG/M2 | TEMPERATURE: 97.7 F | DIASTOLIC BLOOD PRESSURE: 61 MMHG

## 2022-02-18 PROBLEM — I25.10 CAD (CORONARY ARTERY DISEASE): Status: ACTIVE | Noted: 2022-02-18

## 2022-02-18 LAB
APTT PPP: 72.4 SEC (ref 23–36.4)
APTT PPP: 97.7 SEC (ref 23–36.4)
GLUCOSE BLD STRIP.AUTO-MCNC: 141 MG/DL (ref 70–110)
GLUCOSE BLD STRIP.AUTO-MCNC: 171 MG/DL (ref 70–110)
GLUCOSE BLD STRIP.AUTO-MCNC: 178 MG/DL (ref 70–110)

## 2022-02-18 PROCEDURE — 74011250636 HC RX REV CODE- 250/636: Performed by: HOSPITALIST

## 2022-02-18 PROCEDURE — 74011250637 HC RX REV CODE- 250/637: Performed by: HOSPITALIST

## 2022-02-18 PROCEDURE — 74011250636 HC RX REV CODE- 250/636: Performed by: EMERGENCY MEDICINE

## 2022-02-18 PROCEDURE — 74011636637 HC RX REV CODE- 636/637: Performed by: HOSPITALIST

## 2022-02-18 PROCEDURE — 74011250637 HC RX REV CODE- 250/637: Performed by: INTERNAL MEDICINE

## 2022-02-18 PROCEDURE — 82962 GLUCOSE BLOOD TEST: CPT

## 2022-02-18 PROCEDURE — 36415 COLL VENOUS BLD VENIPUNCTURE: CPT

## 2022-02-18 PROCEDURE — 85730 THROMBOPLASTIN TIME PARTIAL: CPT

## 2022-02-18 RX ORDER — ISOSORBIDE DINITRATE 5 MG/1
2.5 TABLET ORAL 2 TIMES DAILY
Status: DISCONTINUED | OUTPATIENT
Start: 2022-02-18 | End: 2022-02-18 | Stop reason: HOSPADM

## 2022-02-18 RX ORDER — FUROSEMIDE 40 MG/1
40 TABLET ORAL DAILY
Qty: 30 TABLET | Refills: 0 | Status: SHIPPED | OUTPATIENT
Start: 2022-02-18

## 2022-02-18 RX ORDER — LOSARTAN POTASSIUM 25 MG/1
12.5 TABLET ORAL DAILY
Qty: 30 TABLET | Refills: 0 | Status: SHIPPED | OUTPATIENT
Start: 2022-02-19

## 2022-02-18 RX ORDER — FUROSEMIDE 40 MG/1
40 TABLET ORAL DAILY
Status: DISCONTINUED | OUTPATIENT
Start: 2022-02-19 | End: 2022-02-18 | Stop reason: HOSPADM

## 2022-02-18 RX ORDER — ISOSORBIDE DINITRATE 5 MG/1
2.5 TABLET ORAL 2 TIMES DAILY
Qty: 30 TABLET | Refills: 0 | Status: SHIPPED | OUTPATIENT
Start: 2022-02-18

## 2022-02-18 RX ORDER — METOPROLOL TARTRATE 25 MG/1
12.5 TABLET, FILM COATED ORAL EVERY 12 HOURS
Qty: 30 TABLET | Refills: 0 | Status: SHIPPED | OUTPATIENT
Start: 2022-02-18

## 2022-02-18 RX ORDER — NITROGLYCERIN 0.4 MG/1
0.4 TABLET SUBLINGUAL
Qty: 30 EACH | Refills: 0 | Status: SHIPPED | OUTPATIENT
Start: 2022-02-18

## 2022-02-18 RX ADMIN — Medication 2 UNITS: at 07:30

## 2022-02-18 RX ADMIN — FUROSEMIDE 40 MG: 10 INJECTION, SOLUTION INTRAMUSCULAR; INTRAVENOUS at 09:00

## 2022-02-18 RX ADMIN — Medication 3 UNITS: at 13:16

## 2022-02-18 RX ADMIN — Medication 500 MG: at 08:22

## 2022-02-18 RX ADMIN — Medication 3 UNITS: at 08:21

## 2022-02-18 RX ADMIN — BUPROPION HYDROCHLORIDE 100 MG: 100 TABLET, EXTENDED RELEASE ORAL at 08:22

## 2022-02-18 RX ADMIN — ASPIRIN 81 MG: 81 TABLET, COATED ORAL at 08:22

## 2022-02-18 RX ADMIN — Medication 2 UNITS: at 13:17

## 2022-02-18 RX ADMIN — HEPARIN SODIUM 23 UNITS/KG/HR: 5000 INJECTION INTRAVENOUS; SUBCUTANEOUS at 08:52

## 2022-02-18 RX ADMIN — ZINC SULFATE 220 MG (50 MG) CAPSULE 1 CAPSULE: CAPSULE at 08:22

## 2022-02-18 RX ADMIN — Medication 2000 UNITS: at 08:22

## 2022-02-18 NOTE — DISCHARGE SUMMARY
Discharge Summary    Patient: Shyanne Drummond MRN: [id number]  CSN: [id number]    YOB: 1965  Age: 49 y.o. Sex: male    DOA: 2/13/2022 LOS:  LOS: 5 days   Discharge Date:      Primary Care Provider:  Mary Kay Bhatia MD    Admission Diagnoses: Non-STEMI (non-ST elevated myocardial infarction) [facility]) [I21.4]  Hypoxia [R09.02]    Discharge Diagnoses:    Problem List as of 2/18/2022 Never Reviewed          Codes Class Noted - Resolved    CAD (coronary artery disease) ICD-10-CM: I25.10  ICD-9-CM: 414.00  2/18/2022 - Present        Systolic CHF, acute on chronic ([facility] ICD-10-CM: I50.23  ICD-9-CM: 428.23, 428.0  2/15/2022 - Present        COVID-19 ICD-10-CM: U07.1  ICD-9-CM: 079.89  2/15/2022 - Present        Hypoxia ICD-10-CM: R09.02  ICD-9-CM: 799.02  2/13/2022 - Present        Non-STEMI (non-ST elevated myocardial infarction) ([facility] ICD-10-CM: I21.4  ICD-9-CM: 410.70  2/13/2022 - Present        DM (diabetes mellitus) ([facility] ICD-10-CM: E11.9  ICD-9-CM: 250.00  Unknown - Present        Pleural effusion ICD-10-CM: J90  ICD-9-CM: 511.9  2/13/2022 - Present        * (Principal) Chest pain ICD-10-CM: R07.9  ICD-9-CM: 786.50  2/13/2022 - Present              Discharge Medications:     Current Discharge Medication List      START taking these medications    Details   furosemide (LASIX) 40 mg tablet Take 1 Tablet by mouth daily. Qty: 30 Tablet, Refills: 0  Start date: 2/18/2022      isosorbide dinitrate (ISORDIL) 5 mg tablet Take 0.5 Tablets by mouth two (2) times a day. Qty: 30 Tablet, Refills: 0  Start date: 2/18/2022      losartan (COZAAR) 25 mg tablet Take 0.5 Tablets by mouth daily. Qty: 30 Tablet, Refills: 0  Start date: 2/19/2022      metoprolol tartrate (LOPRESSOR) 25 mg tablet Take 0.5 Tablets by mouth every twelve (12) hours.   Qty: 30 Tablet, Refills: 0  Start date: 2/18/2022      nitroglycerin (NITROSTAT) 0.4 mg SL tablet 1 Tablet by SubLINGual route every five (5) minutes as needed for Chest Pain. Up to 3 doses. Qty: 30 Each, Refills: 0  Start date: 2/18/2022         CONTINUE these medications which have NOT CHANGED    Details   buPROPion SR (Wellbutrin SR) 150 mg SR tablet Take  by mouth two (2) times a day. glipiZIDE SR (GLUCOTROL XL) 10 mg CR tablet Take 5 mg by mouth two (2) times a day. atorvastatin (LIPITOR) 80 mg tablet Take 80 mg by mouth daily. insulin glargine (Lantus U-100 Insulin) 100 unit/mL injection 50 Units by SubCUTAneous route nightly. SITagliptin-metFORMIN (Janumet) 50-1,000 mg per tablet Take 1 Tablet by mouth two (2) times daily (with meals). aspirin delayed-release 81 mg tablet Take 81 mg by mouth daily. STOP taking these medications       lisinopriL (PRINIVIL, ZESTRIL) 40 mg tablet Comments:   Reason for Stopping:               Discharge Condition: Good    Procedures : Coronary angiogram    Consults: Cardiology      PHYSICAL EXAM   Visit Vitals  /61   Pulse 60   Temp 97.7 °F (36.5 °C)   Resp 20   Ht 5' 8\" (1.727 m)   Wt 78.9 kg (173 lb 15.1 oz)   SpO2 98%   BMI 26.45 kg/m²     General: Awake, cooperative, no acute distress    HEENT: NC, Atraumatic. PERRLA, EOMI. Anicteric sclerae. Lungs:  CTA Bilaterally. No Wheezing/Rhonchi/Rales. Heart:  Regular  rhythm,  No murmur, No Rubs, No Gallops  Abdomen: Soft, Non distended, Non tender. +Bowel sounds,   Extremities: No c/c/e  Psych:   Not anxious or agitated. Neurologic:  No acute neurological deficits. Admission HPI :   Sandra Ochoa is a 49 y.o. male with past medical history of diabetes presents to ER with concerns of chest pain and shortness of breath. Patient reports that he was diagnosed with COVID-19 on January 31, 2022. He is vaccinated including booster. He had mild symptoms, he quarantine for 5 days and return to work. He has been feeling better for the past 1 week. He has been doing fine until yesterday.   He went out and worked in the yard yesterday morning. Last night when he went to sleep he had some shortness of breath, chest pain and cough. He took some Mucinex that helped however he had to wake up in the middle of the night to sit up. This morning he continued to have chest pain and felt more short of breath when he decided to present to ER. Chest pain located in the center of the chest dull throbbing pain. No radiation. Not associated with nausea or diaphoresis. Long history of smoking. He smokes about a pack a day. No history of any heart disease in the past.  In ER he was noted to have elevated high-sensitivity troponin. CTA chest with no PE however showed moderate to large right and moderate left pleural effusion. Additional region of patchy groundglass opacity seen throughout both lower lobe basilar segments and throughout both perihilar upper lobes. His EKG showed normal sinus rhythm, T wave abnormality in lateral leads. Hospital Course :   Mr. Tej Zapata was admitted to monitored floor, he was seen and followed by cardiology. NSTEMI -  He was started on heparin drip. He underwent coronary angiogram with findings of critical triple vessel disease. He is started on aspirin, betablocker, isosorbide, cozaar, statin. SL nitro as needed. He will follow up with CT surgery. Chronic systolic CHF -  Started on diuresis  Will discharge on lasix  Echo with EF of 35-40%, grade 2 DD. Pleural effusion -  Received diuresis    DM -  Uncontrolled, continued on basal insulin. Started on SSI, ADA diet. His HbA1c of 11.4. COVID 19 infection -  Not hypoxic, received antioxidant/vitamin cocktail. Strongly advised to stop smoking and better DM control.     Activity: Activity as tolerated    Diet: Diabetic Diet    Follow-up: PCP, cardiology, CT surgery    Disposition: home    Minutes spent on discharge: 50       Labs: Results:       Chemistry Recent Labs     02/17/22  0435 02/16/22  0625   GLU 93 51*    141   K 3.6 3.5    108   CO2 26 26   BUN 16 16   CREA 0.65 0.72   CA 8.7 8.7   AGAP 8 7   BUCR 25* 22*   AP 86 93   TP 5.6* 6.4   ALB 2.7* 2.8*   GLOB 2.9 3.6   AGRAT 0.9 0.8      CBC w/Diff Recent Labs     02/17/22  0435 02/16/22  0625   WBC 6.7 8.7   RBC 4.62 4.90   HGB 12.9* 14.1   HCT 40.1 43.0    404   GRANS 57  --    LYMPH 26  --    EOS 1  --       Cardiac Enzymes No results for input(s): CPK, CKND1, PETTY in the last 72 hours. No lab exists for component: CKRMB, TROIP   Coagulation Recent Labs     02/18/22  1513 02/18/22  0600 02/17/22  0435 02/17/22  0435   PTP  --   --   --  12.5   INR  --   --   --  1.0   APTT 97.7* 72.4*   < > 93.7*    < > = values in this interval not displayed. Lipid Panel No results found for: CHOL, CHOLPOCT, CHOLX, CHLST, CHOLV, 348954, HDL, HDLP, LDL, LDLC, DLDLP, 126783, VLDLC, VLDL, TGLX, TRIGL, TRIGP, TGLPOCT, CHHD, CHHDX   BNP No results for input(s): BNPP in the last 72 hours. Liver Enzymes Recent Labs     02/17/22  0435   TP 5.6*   ALB 2.7*   AP 86      Thyroid Studies No results found for: T4, T3U, TSH, TSHEXT, TSHEXT         Significant Diagnostic Studies: CTA CHEST W OR W WO CONT    Result Date: 2/13/2022  EXAM: CTA CHEST W OR W WO CONT CLINICAL INDICATION/HISTORY: Shortness of breath, elevated d-dimer COMPARISON: Chest radiograph same day TECHNIQUE: Thin section CT was performed through the chest during pulmonary arterial enhancement. MIP 3D reconstructions were generated to increase sensitivity in the detection of pulmonary emboli. One or more dose reduction techniques were used on this CT: automated exposure control, adjustment of the mAs and/or kVp according to patient size, and iterative reconstruction techniques. The specific techniques used on this CT exam have been documented in the patient's electronic medical record.   Digital Imaging and Communications in Medicine (DICOM) format image data are available to nonaffiliated external healthcare facilities or entities on a secure, media free, reciprocally searchable basis with patient authorization for at least a 12-month period after this study. --- EXAM QUALITY --- 1. Overall exam quality- satisfactory: adequate 2. Adequacy of pulmonary enhancement-optimal: more than enough enhancement to evaluate subsegmental vessels. Determined by main PA density 250 HU are greater 3. Adequacy of breath hold- adequate: sufficient enough to render diagnosis 4. There are no significant noise, beam hardening, streak artifacts affecting scan quality. _______________ FINDINGS: ---  PULMONARY CT ANGIOGRAM  --- PULMONARY VASCULATURE: The right and left central, primary segmental and subsegmental pulmonary arteries appear patent. There is no convincing evidence of intraluminal filling defect identified to suggest pulmonary embolism. THORACIC AORTA: Normal caliber thoracic aorta. No aortic aneurysm, intramural hematoma or dissection. ---  CT CHEST --- LUNG PARENCHYMA:   Patchy groundglass opacities are seen throughout medial and perihilar distributions of both upper lobes. There is also patchy opacity relatively diffusely throughout the basilar segments of both lower lobes. Moderate central and lower lobe bronchial wall thickening. No bronchiectasis. PLEURAL SPACES:   Moderate to large right and moderate-sized left pleural effusions. No pneumothorax. MEDIASTINUM:   No thoracic lymphadenopathy. No global cardiomegaly. No pericardial effusion. OSSEOUS STRUCTURES:   No acute osseous abnormality. Mild thoracic degenerative disk disease. UPPER ABDOMEN: No acute abnormality. _______________     1. No pulmonary thromboembolic disease. 2. Moderate to large right and moderate left pleural effusions. Additional regions of patchy groundglass opacity are also seen throughout both lower lobe basilar segments and throughout both perihilar upper lobes.   Indeterminant to what extent the regions of groundglass opacity representing alveolitis related to moderate central airway disease or instead represent an acute atypical infection such as acute Covid-19 pneumonia. XR CHEST PORT    Result Date: 2/14/2022  EXAM:  AP Portable Chest X-ray 1 view INDICATION: Shortness of breath COMPARISON: February 13, 2022 _______________ FINDINGS:  Heart and mediastinal contours are within normal limits for portable radiograph. There are opacities at both lung bases one quarter the way up likely a combination of effusion with underlying airspace disease. No acute osseous findings. ________________      Small bilateral effusions with lower lobe airspace disease suggesting pneumonia slightly worsened since the prior exam.    XR CHEST PORT    Result Date: 2/13/2022  EXAM: CHEST RADIOGRAPH, SINGLE VIEW CLINICAL INDICATION/HISTORY: Dyspnea, shortness of breath COMPARISON: None. TECHNIQUE: Portable frontal view of the chest was obtained. _______________ FINDINGS: SUPPORT DEVICES: None. HEART AND MEDIASTINUM: Cardiomediastinal silhouette appears within normal limits. Normal caliber thoracic aorta. No central vascular congestion. LUNGS AND PLEURAL SPACES: Mild diffusely increased interstitial markings. Scattered patchy parenchymal opacities are also seen throughout bilateral perihilar and medial right basilar distributions. No definite pleural effusion. No pneumothorax. BONY THORAX AND SOFT TISSUES: No acute osseous abnormality. _______________     Mild diffusely increased interstitial markings and scattered patchy alveolar opacities, appearance most suggestive of an acute atypical multifocal pulmonary infiltrate. ECHO ADULT COMPLETE    Result Date: 2/14/2022    Image quality: adequate. Definity contrast was given to enhance imaging.   Left Ventricle: Left ventricle size is normal. Mildly increased wall thickness. Mild hypokinesis of the following segments: basal anterior, mid anterior, mid inferior, apical anterior and apical inferior.  Moderate hypokinesis of the following segments: basal anterolateral, basal inferolateral, mid anterolateral, mid inferolateral and apical lateral. Mildly reduced left ventricular systolic function with a visually estimated EF of 35 - 40%. Grade II diastolic dysfunction with increased LAP.   Left Atrium: Left atrium is mildly dilated.   Mitral Valve: Mildly calcified leaflets. Mild mitral annular calcification. Mild to moderate transvalvular regurgitation. No stenosis.   Pulmonary artery : Estimated pulmonary arterial systolic pressure is 44 mhg.  Pulmonary hypertension foun to be mild     CARDIAC PROCEDURE    Result Date: 2/18/2022  Risks, benefits, and alternatives and complications of procedure were explained to the patient and appropriate informed consent was obtained prior to the procedure. The patient was brought to the cath lab and was prepped and draped in the usual sterile manner. Moderate sedation was achieved with the appropriate medications. Lidocaine was used to secure local anesthesia. The Left radial artery was cannulated using a modified Seldinger technique and a 6F Russian sheath was placed. LHC was performed using 5F JR 3.5 catheter under fluoroscopic guidance. After obtaining Left heart pressure catheter was pulled back. Angiogram of RCA was done in different views. Catheter was exchanged over 0.035 inch J tip guide wire. 5F FL3.5 catheter was advanced under fluoroscopic guidance and Left main was cannulated. Angiogram of left coronary artery was performed in different views. FL 3.5 catheter was removed. Radial band applied and radial sheath was removed and patient transferred to recovery area. LHC and Coronary angiogram done via left radial approach. Coronary anatomy described below with noted coronary artery disease. LV gram was not done. LVEDP 20 mm hg. No significant gradient on pull back. Patient tolerated procedure well. Scant blood loss. No complications. No specimen removed.  Recommendation: Medication considered: Aspirin, beta blocker, ACEI, Diuretics, Nitrates and statin CAD risk factor education Diet education Control cholesterol Blood pressure control Exercise education: Age and functional status appropriate. Strongly advised to quit smoking CT surgery consultation for CABG        No results found for this or any previous visit. Please note that this dictation was completed with [employer], the computer voice recognition software. Quite often unanticipated grammatical, syntax, homophones, and other interpretive errors are inadvertently transcribed by the computer software. Please disregard these errors. Please excuse any errors that have escaped final proofreading.      CC: Amarilys Tate MD

## 2022-02-18 NOTE — PROGRESS NOTES
Problem: Falls - Risk of  Goal: *Absence of Falls  Description: Document Sofia Fothergill Fall Risk and appropriate interventions in the flowsheet. Outcome: Progressing Towards Goal  Variance Patient Condition  Note: Fall Risk Interventions:  Mobility Interventions: Assess mobility with egress test         Medication Interventions: Teach patient to arise slowly         History of Falls Interventions: Vital signs minimum Q4HRs X 24 hrs (comment for end date)         Problem: Patient Education: Go to Patient Education Activity  Goal: Patient/Family Education  Outcome: Progressing Towards Goal     Problem: Airway Clearance - Ineffective  Goal: Achieve or maintain patent airway  Outcome: Progressing Towards Goal     Problem: Gas Exchange - Impaired  Goal: Absence of hypoxia  Outcome: Progressing Towards Goal  Goal: Promote optimal lung function  Outcome: Progressing Towards Goal     Problem: Breathing Pattern - Ineffective  Goal: Ability to achieve and maintain a regular respiratory rate  Outcome: Progressing Towards Goal     Problem:  Body Temperature -  Risk of, Imbalanced  Goal: Ability to maintain a body temperature within defined limits  Outcome: Progressing Towards Goal  Goal: Will regain or maintain usual level of consciousness  Outcome: Progressing Towards Goal  Goal: Complications related to the disease process, condition or treatment will be avoided or minimized  Outcome: Progressing Towards Goal     Problem: Isolation Precautions - Risk of Spread of Infection  Goal: Prevent transmission of infectious organism to others  Outcome: Progressing Towards Goal     Problem: Nutrition Deficits  Goal: Optimize nutrtional status  Outcome: Progressing Towards Goal     Problem: Risk for Fluid Volume Deficit  Goal: Maintain normal heart rhythm  Outcome: Progressing Towards Goal  Goal: Maintain absence of muscle cramping  Outcome: Progressing Towards Goal  Goal: Maintain normal serum potassium, sodium, calcium, phosphorus, and pH  Outcome: Progressing Towards Goal     Problem: Loneliness or Risk for Loneliness  Goal: Demonstrate positive use of time alone when socialization is not possible  Outcome: Progressing Towards Goal     Problem: Fatigue  Goal: Verbalize increase energy and improved vitality  Outcome: Progressing Towards Goal     Problem: Patient Education: Go to Patient Education Activity  Goal: Patient/Family Education  Outcome: Progressing Towards Goal     Problem: Diabetes Self-Management  Goal: *Disease process and treatment process  Description: Define diabetes and identify own type of diabetes; list 3 options for treating diabetes. Outcome: Progressing Towards Goal  Goal: *Incorporating nutritional management into lifestyle  Description: Describe effect of type, amount and timing of food on blood glucose; list 3 methods for planning meals. Outcome: Progressing Towards Goal  Goal: *Incorporating physical activity into lifestyle  Description: State effect of exercise on blood glucose levels. Outcome: Progressing Towards Goal  Goal: *Developing strategies to promote health/change behavior  Description: Define the ABC's of diabetes; identify appropriate screenings, schedule and personal plan for screenings. Outcome: Progressing Towards Goal  Goal: *Using medications safely  Description: State effect of diabetes medications on diabetes; name diabetes medication taking, action and side effects. Outcome: Progressing Towards Goal  Goal: *Monitoring blood glucose, interpreting and using results  Description: Identify recommended blood glucose targets  and personal targets. Outcome: Progressing Towards Goal  Goal: *Prevention, detection, treatment of acute complications  Description: List symptoms of hyper- and hypoglycemia; describe how to treat low blood sugar and actions for lowering  high blood glucose level.   Outcome: Progressing Towards Goal  Goal: *Prevention, detection and treatment of chronic complications  Description: Define the natural course of diabetes and describe the relationship of blood glucose levels to long term complications of diabetes. Outcome: Progressing Towards Goal  Goal: *Developing strategies to address psychosocial issues  Description: Describe feelings about living with diabetes; identify support needed and support network  Outcome: Progressing Towards Goal  Goal: *Insulin pump training  Outcome: Progressing Towards Goal  Goal: *Sick day guidelines  Outcome: Progressing Towards Goal  Goal: *Patient Specific Goal (EDIT GOAL, INSERT TEXT)  Outcome: Progressing Towards Goal     Problem: Patient Education: Go to Patient Education Activity  Goal: Patient/Family Education  Outcome: Progressing Towards Goal     Problem:  Moderate Sedation (Adult)  Goal: *Patent airway  Outcome: Progressing Towards Goal  Goal: *Adequate oxygenation  Outcome: Progressing Towards Goal  Goal: *Absence of aspiration  Outcome: Progressing Towards Goal  Goal: *Hemodynamically stable  Outcome: Progressing Towards Goal  Goal: *Optimal pain control at patient's stated goal  Outcome: Progressing Towards Goal  Goal: *Absence of nausea/vomiting  Outcome: Progressing Towards Goal  Goal: *Anxiety reduced or absent  Outcome: Progressing Towards Goal  Goal: *Absence of injury  Outcome: Progressing Towards Goal  Goal: *Level of consciousness returns to baseline  Outcome: Progressing Towards Goal  Goal: Interventions  Outcome: Progressing Towards Goal     Problem: Patient Education: Go to Patient Education Activity  Goal: Patient/Family Education  Outcome: Progressing Towards Goal     Problem: Patient Education: Go to Patient Education Activity  Goal: Patient/Family Education  Outcome: Progressing Towards Goal     Problem: Cath Lab Procedures: Pre-Procedure  Goal: Off Pathway (Use only if patient is Off Pathway)  Outcome: Progressing Towards Goal  Goal: Activity/Safety  Outcome: Progressing Towards Goal  Goal: Consults, if ordered  Outcome: Progressing Towards Goal  Goal: Diagnostic Test/Procedures  Outcome: Progressing Towards Goal  Goal: Nutrition/Diet  Outcome: Progressing Towards Goal  Goal: Discharge Planning  Outcome: Progressing Towards Goal  Goal: Medications  Outcome: Progressing Towards Goal  Goal: Respiratory  Outcome: Progressing Towards Goal  Goal: Treatments/Interventions/Procedures  Outcome: Progressing Towards Goal  Goal: Psychosocial  Outcome: Progressing Towards Goal  Goal: *Verbalize description of procedure  Outcome: Progressing Towards Goal  Goal: *Consent signed  Outcome: Progressing Towards Goal     Problem: Cath Lab Procedures: Post-Cath Day of Procedure (Initiate SCIP Measures for Post-Op Care)  Goal: Off Pathway (Use only if patient is Off Pathway)  Outcome: Progressing Towards Goal  Goal: Activity/Safety  Outcome: Progressing Towards Goal  Goal: Consults, if ordered  Outcome: Progressing Towards Goal  Goal: Diagnostic Test/Procedures  Outcome: Progressing Towards Goal  Goal: Nutrition/Diet  Outcome: Progressing Towards Goal  Goal: Discharge Planning  Outcome: Progressing Towards Goal  Goal: Medications  Outcome: Progressing Towards Goal  Goal: Respiratory  Outcome: Progressing Towards Goal  Goal: Treatments/Interventions/Procedures  Outcome: Progressing Towards Goal  Goal: Psychosocial  Outcome: Progressing Towards Goal  Goal: *Procedure site is without bleeding and signs of infection six hours post sheath removal  Outcome: Progressing Towards Goal  Goal: *Hemodynamically stable  Outcome: Progressing Towards Goal  Goal: *Optimal pain control at patient's stated goal  Outcome: Progressing Towards Goal     Problem: Cath Lab Procedures: Post-Cath Day 1  Goal: Off Pathway (Use only if patient is Off Pathway)  Outcome: Progressing Towards Goal  Goal: Activity/Safety  Outcome: Progressing Towards Goal  Goal: Diagnostic Test/Procedures  Outcome: Progressing Towards Goal  Goal: Nutrition/Diet  Outcome: Progressing Towards Goal  Goal: Discharge Planning  Outcome: Progressing Towards Goal  Goal: Medications  Outcome: Progressing Towards Goal  Goal: Respiratory  Outcome: Progressing Towards Goal  Goal: Treatments/Interventions/Procedures  Outcome: Progressing Towards Goal  Goal: Psychosocial  Outcome: Progressing Towards Goal     Problem: Cath Lab Procedures: Discharge Outcomes  Goal: *Stable cardiac rhythm  Outcome: Progressing Towards Goal  Goal: *Hemodynamically stable  Outcome: Progressing Towards Goal  Goal: *Optimal pain control at patient's stated goal  Outcome: Progressing Towards Goal  Goal: *Pulses palpable, skin color within defined limits, skin temperature warm  Outcome: Progressing Towards Goal  Goal: *Lungs clear or at baseline  Outcome: Progressing Towards Goal  Goal: *Demonstrates ability to perform prescribed activity without shortness of breath or discomfort  Outcome: Progressing Towards Goal  Goal: *Verbalizes home exercise program, activity guidelines, cardiac precautions  Outcome: Progressing Towards Goal  Goal: *Verbalizes understanding and describes prescribed diet  Outcome: Progressing Towards Goal  Goal: *Verbalizes understanding and describes medication purposes and frequencies  Outcome: Progressing Towards Goal  Goal: *Identifies cardiac risk factors  Outcome: Progressing Towards Goal  Goal: *No signs and symptoms of infection or wound complications  Outcome: Progressing Towards Goal  Goal: *Anxiety reduced or absent  Outcome: Progressing Towards Goal  Goal: *Verbalizes and demonstrates incision care  Outcome: Progressing Towards Goal  Goal: *Understands and describes signs and symptoms to report to providers(Stroke Metric)  Outcome: Progressing Towards Goal  Goal: *Describes follow-up/return visits to physicians  Outcome: Progressing Towards Goal  Goal: *Describes available resources and support systems  Outcome: Progressing Towards Goal  Goal: *Influenza immunization  Outcome: Progressing Towards Goal  Goal: *Pneumococcal immunization  Outcome: Progressing Towards Goal

## 2022-02-18 NOTE — PROGRESS NOTES
Shift uneventful. 0710 Bedside and Verbal shift change report given to Sariah Martinez RN (oncoming nurse) by Je Ventura RN   (offgoing nurse). Report included the following information SBAR, Kardex, ED Summary, Intake/Output, MAR, Recent Results and Med Rec Status.

## 2022-02-18 NOTE — PROGRESS NOTES
DC Plan: home with cardiosurgeon follow up    Care manager noted results of cath and per hospitalist, cardiologist will be providing follow up recommendation for cardiac surgeon and CMS will assist if needed to set up appointment. Care manager met with patient in droplet precaution PPE; patient unhappy with length of time he was NPO yesterday prior to cardiac cath; concerned about his diabetic management although he did state that he had been noncompliant when he was feeling ill with covid; stated he is going to be more compliant moving forward. No care management needs other that follow up appointments; anticipate patient will be discharging later today home with wife. Care Management Interventions  PCP Verified by CM:  Yes  Palliative Care Criteria Met (RRAT>21 & CHF Dx)?: No  Transition of Care Consult (CM Consult): Discharge Planning  Support Systems: Spouse/Significant Other  Confirm Follow Up Transport: Family  The Plan for Transition of Care is Related to the Following Treatment Goals : NSTEMI  The Patient and/or Patient Representative was Provided with a Choice of Provider and Agrees with the Discharge Plan?: Yes  Name of the Patient Representative Who was Provided with a Choice of Provider and Agrees with the Discharge Plan: Evelyn Ritchie, patient  Discharge Location  Patient Expects to be Discharged to[de-identified] Home with family assistance

## 2022-02-18 NOTE — PROGRESS NOTES
0700  Received bedside report from [facility]. Report included KARDEX, SBAR, med rec, and labs.

## 2022-02-18 NOTE — PROGRESS NOTES
Cardiology Progress Note        Patient: Melita Polanco        Sex: male          DOA: 2/13/2022  YOB: 1965      Age:  49 y.o.        LOS:  LOS: 5 days      Patient seen and examined, chart reviewed. Assessment/Plan     Patient Active Problem List   Diagnosis Code    Hypoxia R09.02    Non-STEMI (non-ST elevated myocardial infarction) ([facility]) I21.4    DM (diabetes mellitus) ([facility] [address] E11.9    Pleural effusion J90    Chest pain N53.1    Systolic CHF, acute K80.33    COVID-19 U07.1      Current smoker    CT chest reported     1. No pulmonary thromboembolic disease. 2. Moderate to large right and moderate left pleural effusions. Additional regions of patchy groundglass opacity are also seen throughout both lower lobe basilar segments and throughout both perihilar upper lobes.  Indeterminant to what extent the regions of groundglass opacity representing alveolitis related to moderate central airway disease or instead represent an acute atypical  infection such as acute Covid-19 pneumonia.     Echocardiogram revealed    Image quality: adequate. Definity contrast was given to enhance imaging.   Left Ventricle: Left ventricle size is normal. Mildly increased wall thickness. Mild hypokinesis of the following segments: basal anterior, mid anterior, mid inferior, apical anterior and apical inferior. Moderate hypokinesis of the following segments: basal anterolateral, basal inferolateral, mid anterolateral, mid inferolateral and apical lateral. Mildly reduced left ventricular systolic function with a visually estimated EF of 35 - 40%. Grade II diastolic dysfunction with increased LAP.   Left Atrium: Left atrium is mildly dilated.   Mitral Valve: Mildly calcified leaflets. Mild mitral annular calcification. Mild to moderate transvalvular regurgitation. No stenosis.     Pulmonary artery : Estimated pulmonary arterial systolic pressure is 44 mhg.  Pulmonary hypertension foun to be mild      Coronary angiogram revealed    Left Main   Left main is very heavily calcified with luminal irregularities. Left main trifurcates in LAD, ramus and circumflex   Left Anterior Descending   LAD is heavily calcified at its origin. Ostial LAD has 90% stenosis. LAD has luminal irregularities. D1 is very small caliber vessel and has ostial proximal 90% stenosis. RPDA, RPLA and distal RCA fills by left-to-right collaterals   Ramus Intermedius   Ramus is very small caliber short vessel with diffuse disease up to 90%   Left Circumflex   Circumflex is non dominant vessel. Proximal circumflex has 95% followed by 80% stenosis. Mid to distal circumflex has luminal irregularities. Mid circumflex after origin of OM1 has 60% stenosis OM1 is medium caliber vessel with luminal irregularities. OM1 as superior branch and is 100% occluded and seen by left-to-left collaterals. Right Coronary Artery   Right coronary artery is dominant vessel. Right coronary artery is calcified throughout its course. Proximal RCA has 95% stenosis followed by chronic total occlusion in mid portion with some bridging collaterals. Distal RCA is not seen from right coronary injection. Left Ventricle LV gram was not done. LVEDP 20 mm hg. No significant gradient on pull back         Plan:    Continue aspirin and atorvastatin. Continue Lasix  Continue metoprolol tartrate 12.5 mg by mouth twice a day. Continue Losartan to 12.5 mg once a day    Start isosorbide dinitrate 2.5 mg by mouth twice a day  Will add Spironolactone as per blood pressure response. Salt restriction up to 2 g per day and fluid restriction up to 1.2 L per day  Patient is in droplet plus isolation    Patient would like to go to [facility] as an outpatient for CABG evaluation. No strenuous exercise or strenuous activity for now. No weight lifting no more than 10 lb from left hand for 1 week. Follow-up in cardiology clinic after discharge.   Plan discussed with patient in great detail, all risks, benefits and alternatives explained to patient he verbally understood. Plan discussed with               Subjective:    cc: My breathing is better today, Denies any chest pain, I am ready to go home      REVIEW OF SYSTEMS:     General: No fevers or chills. Cardiovascular: No chest pain,No palpitations, No orthopnea, No PND,  no leg swelling, No claudication  Pulmonary: No dyspnea. Positive Cough   Gastrointestinal: No nausea, vomiting, bleeding  Neurology: No Dizziness    Objective:      Visit Vitals  /75   Pulse 63   Temp 97.7 °F (36.5 °C)   Resp 20   Ht 5' 8\" (1.727 m)   Wt 78.9 kg (173 lb 15.1 oz)   SpO2 97%   BMI 26.45 kg/m²     Body mass index is 26.45 kg/m². Physical Exam:  General Appearance: Comfortable, not using accessory muscles of respiration. HEENT: EKATERINA. HEAD: Atraumatic  NECK: No JVD, no thyroidomeglay. CAROTIDS: No bruit  LUNGS: bibasilar absent air entry    HEART: S1+S2 audible, no murmur, no pericardial rub. ABD: Non-tender, BS Audible    EXT:   absent lower extremity edema, and no cyanosis. Left wrist:  No swelling, no hematoma, no ecchymosis, no tenderness, left radial pulse present, normal motor and sensory exam of left hand  VASCULAR EXAM: Pulses are intact. PSYCHIATRIC EXAM: Mood is appropriate. MUSCULOSKELETAL: Grossly no joint deformity.   NEUROLOGICAL: AAO times 3, No motor and sensory deficit    Medication:  Current Facility-Administered Medications   Medication Dose Route Frequency    [START ON 2/19/2022] furosemide (LASIX) tablet 40 mg  40 mg Oral DAILY    isosorbide dinitrate (ISORDIL) tablet 2.5 mg  2.5 mg Oral BID    dextrose 10% infusion 125-250 mL  125-250 mL IntraVENous PRN    sodium chloride (NS) flush 5-40 mL  5-40 mL IntraVENous Q8H    sodium chloride (NS) flush 5-40 mL  5-40 mL IntraVENous PRN    insulin glargine (LANTUS) injection 10 Units  10 Units SubCUTAneous QHS    losartan (COZAAR) tablet 12.5 mg 12.5 mg Oral DAILY    insulin lispro (HUMALOG) injection 3 Units  3 Units SubCUTAneous TIDAC    nicotine (NICODERM CQ) 14 mg/24 hr patch 1 Patch  1 Patch TransDERmal DAILY    metoprolol tartrate (LOPRESSOR) tablet 12.5 mg  12.5 mg Oral Q12H    ascorbic acid (vitamin C) (VITAMIN C) tablet 500 mg  500 mg Oral DAILY    melatonin (rapid dissolve) tablet 5 mg  5 mg Oral QHS    zinc sulfate (ZINCATE) 50 mg zinc (220 mg) capsule 1 Capsule  1 Capsule Oral DAILY    cholecalciferol (VITAMIN D3) (1000 Units /25 mcg) tablet 2,000 Units  2,000 Units Oral DAILY    buPROPion SR (WELLBUTRIN SR) tablet 100 mg  100 mg Oral BID    heparin 25,000 units in  ml infusion  12-25 Units/kg/hr IntraVENous TITRATE    glucagon (GLUCAGEN) injection 1 mg  1 mg IntraMUSCular PRN    glucose chewable tablet 16 g  16 g Oral PRN    insulin lispro (HUMALOG) injection   SubCUTAneous AC&HS    aspirin delayed-release tablet 81 mg  81 mg Oral DAILY    atorvastatin (LIPITOR) tablet 40 mg  40 mg Oral QHS               Lab/Data Reviewed:       Recent Labs     02/17/22  0435 02/16/22  0625   WBC 6.7 8.7   HGB 12.9* 14.1   HCT 40.1 43.0    404     Recent Labs     02/17/22  0435 02/16/22  0625    141   K 3.6 3.5    108   CO2 26 26   GLU 93 51*   BUN 16 16   CREA 0.65 0.72   CA 8.7 8.7         Signed By: Chapis Villanueva MD     February 18, 2022

## 2022-02-18 NOTE — PROGRESS NOTES
Hospitalist Progress Note    Patient: Esmer Sky MRN: [id number]  CSN: [id number]    YOB: 1965  Age: 49 y.o. Sex: male    DOA: 2/13/2022 LOS:  LOS: 4 days                Assessment/Plan     Patient Active Problem List   Diagnosis Code    Hypoxia R09.02    Non-STEMI (non-ST elevated myocardial infarction) ([facility]) I21.4    DM (diabetes mellitus) ([facility] [address] E11.9    Pleural effusion J90    Chest pain A98.4    Systolic CHF, acute on chronic (HCC) I50.23    COVID-19 U07.1        Chief complaint :  Chest pain, SOB    Shortness of breath-  resolved  Likely secondary to CHF, pleural effusion  Started on diuresis  Follow-up chest x-ray with small pleural effusions.     CHF-  Echo with EF of 35-40%, grade 2 DD. Continue with diuresis      Pleural effusion-  Likely secondary to CHF  Diuresis as above     NSTEMI-  On heparin drip  Aspirin, statin  For cath today.     DM-  Uncontrolled, HbA1c of 11.4  on Lantus  Sliding-scale insulin  Diabetic diet. COVID 19 positive -  Not hypoxic  Antioxidant/vitamin cocktail        Disposition : 2-3 days    Review of systems  General: No fevers or chills. Cardiovascular: No chest pain or pressure. No palpitations. Pulmonary: No shortness of breath. Gastrointestinal: No nausea, vomiting. Physical Exam:  General: Awake, cooperative, no acute distress    HEENT: NC, Atraumatic. PERRLA, anicteric sclerae. Lungs: Decreased breath sounds lower lungs bilaterally. Heart:  S1 S2,  No murmur, No Rubs, No Gallops  Abdomen: Soft, Non distended, Non tender.  +Bowel sounds,   Extremities: No c/c/e  Psych:   Not anxious or agitated. Neurologic:  No acute neurological deficit.            Vital signs/Intake and Output:  Visit Vitals  /77 (BP 1 Location: Right upper arm, BP Patient Position: At rest)   Pulse 60   Temp 99.3 °F (37.4 °C)   Resp 20   Ht 5' 8\" (1.727 m)   Wt 79.8 kg (175 lb 14.4 oz)   SpO2 97%   BMI 26.75 kg/m²     Current Shift:  No intake/output data recorded. Last three shifts:  02/16 0701 - 02/17 1900  In: 4660 [P.O.:4660]  Out: -             Labs: Results:       Chemistry Recent Labs     02/17/22  0435 02/16/22  0625 02/15/22  0200   GLU 93 51* 85    141 142   K 3.6 3.5 4.1    108 106   CO2 26 26 30   BUN 16 16 18   CREA 0.65 0.72 0.92   CA 8.7 8.7 8.8   AGAP 8 7 6   BUCR 25* 22* 20   AP 86 93 96   TP 5.6* 6.4 5.4*   ALB 2.7* 2.8* 2.6*   GLOB 2.9 3.6 2.8   AGRAT 0.9 0.8 0.9      CBC w/Diff Recent Labs     02/17/22  0435 02/16/22  0625 02/15/22  0200   WBC 6.7 8.7 8.1   RBC 4.62 4.90 4.71   HGB 12.9* 14.1 13.3   HCT 40.1 43.0 41.1    404 398   GRANS 57  --  64   LYMPH 26  --  24   EOS 1  --  1      Cardiac Enzymes No results for input(s): CPK, CKND1, PETTY in the last 72 hours. No lab exists for component: CKRMB, TROIP   Coagulation Recent Labs     02/17/22 0435 02/16/22 0625   PTP 12.5  --    INR 1.0  --    APTT 93.7* 107.6*       Lipid Panel No results found for: CHOL, CHOLPOCT, CHOLX, CHLST, CHOLV, 318946, HDL, HDLP, LDL, LDLC, DLDLP, 572304, VLDLC, VLDL, TGLX, TRIGL, TRIGP, TGLPOCT, CHHD, CHHDX   BNP No results for input(s): BNPP in the last 72 hours.    Liver Enzymes Recent Labs     02/17/22 0435   TP 5.6*   ALB 2.7*   AP 86      Thyroid Studies No results found for: T4, T3U, TSH, TSHEXT, TSHEXT     Procedures/imaging: see electronic medical records for all procedures/Xrays and details which were not copied into this note but were reviewed prior to creation of Plan

## 2022-02-18 NOTE — DISCHARGE INSTRUCTIONS
Patient Education     DISCHARGE SUMMARY from Nurse    PATIENT INSTRUCTIONS:    After general anesthesia or intravenous sedation, for 24 hours or while taking prescription Narcotics:  · Limit your activities  · Do not drive and operate hazardous machinery  · Do not make important personal or business decisions  · Do  not drink alcoholic beverages  · If you have not urinated within 8 hours after discharge, please contact your surgeon on call. Report the following to your surgeon:  · Excessive pain, swelling, redness or odor of or around the surgical area  · Temperature over 101.5  · Nausea and vomiting lasting longer than 4 hours or if unable to take medications  · Any signs of decreased circulation or nerve impairment to extremity: change in color, persistent  numbness, tingling, coldness or increase pain  · Any questions    What to do at Home:  Recommended activity: as tolerated, no pushing or pulling with your left hand  If you experience any of the following symptoms shortness of breath or chest pain, please follow up with your physician. *  Please give a list of your current medications to your Primary Care Provider. *  Please update this list whenever your medications are discontinued, doses are      changed, or new medications (including over-the-counter products) are added. *  Please carry medication information at all times in case of emergency situations. These are general instructions for a healthy lifestyle:    No smoking/ No tobacco products/ Avoid exposure to second hand smoke  Surgeon General's Warning:  Quitting smoking now greatly reduces serious risk to your health.     Obesity, smoking, and sedentary lifestyle greatly increases your risk for illness    A healthy diet, regular physical exercise & weight monitoring are important for maintaining a healthy lifestyle    You may be retaining fluid if you have a history of heart failure or if you experience any of the following symptoms: Weight gain of 3 pounds or more overnight or 5 pounds in a week, increased swelling in our hands or feet or shortness of breath while lying flat in bed. Please call your doctor as soon as you notice any of these symptoms; do not wait until your next office visit. The discharge information has been reviewed with the {PATIENT PARENT GUARDIAN:[id number]}. The {PATIENT PARENT GUARDIAN:66084} verbalized understanding. Discharge medications reviewed with the {Dishcarge meds reviewed ZOYK:[id number]} and appropriate educational materials and side effects teaching were provided. ___________________________________________________________________________________________________________________________________     Patient Education        Learning About ACE Inhibitors for Heart Failure  Introduction     ACE (angiotensin-converting enzyme) inhibitors block an enzyme that makes blood vessels narrow. As a result, the blood vessels relax and widen. This lowers blood pressure. These medicines also put more water and salt into the urine. This also lowers blood pressure. In heart failure, your heart does not pump as much blood as your body needs. These medicines can help:  · Make it easier for your heart to pump. · Reduce symptoms. · Make it less likely you will need to stay in a hospital.  · Lower the risk of early death. Examples  · benazepril (Lotensin)  · enalapril (Vasotec)  · lisinopril (Prinivil, Zestril)  · ramipril (Altace)  This is not a complete list.  Possible side effects  Side effects may include:  · A cough. · Low blood pressure. This can make you feel dizzy or weak. · Too much potassium in your body. · Swelling of your lips, tongue, or face. If the swelling is severe, you may need treatment right away. Severe swelling can make it hard to breathe, but this is rare. You may have other side effects or reactions not listed here. Check the information that comes with your medicine.   What to know about taking this medicine  · ACE inhibitors can cause a dry cough. Talk to your doctor if you have a dry cough. You may need a different medicine. · These medicines can cause an allergic reaction. This can cause a little swelling. Or it can cause red bumps on your skin that hurt. In rare cases, the swelling may make it hard for you to breathe. · Do not take this medicine if you are pregnant or plan to become pregnant. · Take your medicines exactly as prescribed. Call your doctor if you think you are having a problem with your medicine. · Check with your doctor or pharmacist before you use any other medicines. This includes over-the-counter medicines. Make sure your doctor knows all of the medicines, vitamins, herbal products, and supplements you take. Taking some medicines together can cause problems. · You may need regular blood tests. Where can you learn more? Go to http://www.gray.com/  Enter S209 in the search box to learn more about \"Learning About ACE Inhibitors for Heart Failure. \"  Current as of: April 29, 2021               Content Version: 13.0  © 9663-9123 [employer]. Care instructions adapted under license by [employer] (which disclaims liability or warranty for this information). If you have questions about a medical condition or this instruction, always ask your healthcare professional. [address] any warranty or liability for your use of this information. Angina: Care Instructions  Your Care Instructions     You have a problem called angina. Angina happens when there is not enough blood flow to your heart muscle. Angina is a sign of coronary artery disease (CAD). CAD occurs when blood vessels that supply the heart become narrowed. Having CAD increases your risk of a heart attack. Chest pain or pressure is the most common symptom of angina.  But some people have other symptoms, like:  · Pain, pressure, or a strange feeling in the back, neck, jaw, or upper belly, or in one or both shoulders or arms. · Shortness of breath. · Nausea or vomiting. · Lightheadedness or sudden weakness. · Fast or irregular heartbeat. Women are somewhat more likely than men to have angina symptoms like shortness of breath, nausea, and back or jaw pain. Angina can be dangerous. That's why it is important to pay attention to your symptoms. Know what is typical for you, learn how to control your symptoms, and understand when you need to get treatment. A change in your usual pattern of symptoms is an emergency. It may mean that you are having a heart attack. The doctor has checked you carefully, but problems can develop later. If you notice any problems or new symptoms, get medical treatment right away. Follow-up care is a key part of your treatment and safety. Be sure to make and go to all appointments, and call your doctor if you are having problems. It's also a good idea to know your test results and keep a list of the medicines you take. How can you care for yourself at home? Medicines    · If your doctor has given you nitroglycerin for angina symptoms, keep it with you at all times. If you have symptoms, sit down and rest, and take the first dose of nitroglycerin as directed. If your symptoms get worse or are not getting better within 5 minutes, call 911 right away. Stay on the phone. The emergency  will give you further instructions.     · If your doctor advises it, take 1 low-dose aspirin a day to prevent heart attack.     · Be safe with medicines. Take your medicines exactly as prescribed. Call your doctor if you think you are having a problem with your medicine. You will get more details on the specific medicines your doctor prescribes. Lifestyle changes    · Do not smoke. If you need help quitting, talk to your doctor about stop-smoking programs and medicines.  These can increase your chances of quitting for good.     · Eat a heart-healthy diet that is low in saturated fat and salt, and is high in fiber. Talk to your doctor or a dietitian about healthy eating.     · Stay at a healthy weight. Or lose weight if you need to. Activity    · Talk to your doctor about a level of activity that is safe for you.     · If an activity causes angina symptoms, stop and rest.   When should you call for help? Call 911 anytime you think you may need emergency care. For example, call if:    · You passed out (lost consciousness).     · You have symptoms of a heart attack. These may include:  ? Chest pain or pressure, or a strange feeling in the chest.  ? Sweating. ? Shortness of breath. ? Nausea or vomiting. ? Pain, pressure, or a strange feeling in the back, neck, jaw, or upper belly or in one or both shoulders or arms. ? Lightheadedness or sudden weakness. ? A fast or irregular heartbeat. After you call 911, the  may tell you to chew 1 adult-strength or 2 to 4 low-dose aspirin. Wait for an ambulance. Do not try to drive yourself.     · You have angina symptoms that do not go away with rest or are not getting better within 5 minutes after you take a dose of nitroglycerin. Call your doctor now if:    · Your angina symptoms seem worse but still follow your typical pattern. You can predict when symptoms will happen, but they may come on sooner, feel worse, or last longer.     · You feel dizzy or lightheaded, or you feel like you may faint. Watch closely for changes in your health, and be sure to contact your doctor if you have any problems. Where can you learn more? Go to http://www.gray.com/  Enter H129 in the search box to learn more about \"Angina: Care Instructions. \"  Current as of: April 29, 2021               Content Version: 13.0  © 0755-8867 [employer]. Care instructions adapted under license by [employer] (which disclaims liability or warranty for this information).  If you have questions about a medical condition or this instruction, always ask your healthcare professional. [place] [address] any warranty or liability for your use of this information.

## 2022-02-18 NOTE — PROGRESS NOTES
Problem: Falls - Risk of  Goal: *Absence of Falls  Description: Document Lettie Duverney Fall Risk and appropriate interventions in the flowsheet. 2/18/2022 1806 by Benny Cuellar RN  Outcome: Resolved/Met  Variance Patient Condition  Note: Fall Risk Interventions:  Mobility Interventions: Assess mobility with egress test         Medication Interventions: Teach patient to arise slowly         History of Falls Interventions: Vital signs minimum Q4HRs X 24 hrs (comment for end date)      2/18/2022 1450 by Benny Cuellar RN  Outcome: Progressing Towards Goal  Variance Patient Condition  Note: Fall Risk Interventions:  Mobility Interventions: Assess mobility with egress test         Medication Interventions: Teach patient to arise slowly         History of Falls Interventions: Vital signs minimum Q4HRs X 24 hrs (comment for end date)         Problem: Patient Education: Go to Patient Education Activity  Goal: Patient/Family Education  2/18/2022 1806 by Benny Cuellar RN  Outcome: Resolved/Met  2/18/2022 1450 by Benny Cuellar RN  Outcome: Progressing Towards Goal     Problem: Airway Clearance - Ineffective  Goal: Achieve or maintain patent airway  2/18/2022 1806 by Benny Cuellar RN  Outcome: Resolved/Met  2/18/2022 1450 by Benny Cuellar RN  Outcome: Progressing Towards Goal     Problem: Gas Exchange - Impaired  Goal: Absence of hypoxia  2/18/2022 1806 by Benny Cuellar RN  Outcome: Resolved/Met  2/18/2022 1450 by Benny Cuellar RN  Outcome: Progressing Towards Goal  Goal: Promote optimal lung function  2/18/2022 1806 by Benny Cuellar RN  Outcome: Resolved/Met  2/18/2022 1450 by Benny Cuellar RN  Outcome: Progressing Towards Goal     Problem: Breathing Pattern - Ineffective  Goal: Ability to achieve and maintain a regular respiratory rate  2/18/2022 1806 by Benny Cuellar RN  Outcome: Resolved/Met  2/18/2022 1450 by Benny Cuellar RN  Outcome: Progressing Towards Goal     Problem:  Body Temperature - Risk of, Imbalanced  Goal: Ability to maintain a body temperature within defined limits  2/18/2022 1806 by Ailyn Navas RN  Outcome: Resolved/Met  2/18/2022 1450 by Ailyn Navas RN  Outcome: Progressing Towards Goal  Goal: Will regain or maintain usual level of consciousness  2/18/2022 1806 by Ailyn Navas RN  Outcome: Resolved/Met  2/18/2022 1450 by Ailyn Navas RN  Outcome: Progressing Towards Goal  Goal: Complications related to the disease process, condition or treatment will be avoided or minimized  2/18/2022 1806 by Ailyn Navas RN  Outcome: Resolved/Met  2/18/2022 1450 by Ailyn Navas RN  Outcome: Progressing Towards Goal     Problem: Isolation Precautions - Risk of Spread of Infection  Goal: Prevent transmission of infectious organism to others  2/18/2022 1806 by Ailyn Navas RN  Outcome: Resolved/Met  2/18/2022 1450 by Ailyn Navas RN  Outcome: Progressing Towards Goal     Problem: Nutrition Deficits  Goal: Optimize nutrtional status  2/18/2022 1806 by Ailyn Navas RN  Outcome: Resolved/Met  2/18/2022 1450 by Ailyn Navas RN  Outcome: Progressing Towards Goal     Problem: Risk for Fluid Volume Deficit  Goal: Maintain normal heart rhythm  2/18/2022 1806 by Ailyn Navas RN  Outcome: Resolved/Met  2/18/2022 1450 by Ailyn Navas RN  Outcome: Progressing Towards Goal  Goal: Maintain absence of muscle cramping  2/18/2022 1806 by Ailyn Navas RN  Outcome: Resolved/Met  2/18/2022 1450 by Ailyn Navas RN  Outcome: Progressing Towards Goal  Goal: Maintain normal serum potassium, sodium, calcium, phosphorus, and pH  2/18/2022 1806 by Ailyn Navas RN  Outcome: Resolved/Met  2/18/2022 1450 by Ailyn Navas RN  Outcome: Progressing Towards Goal     Problem: Loneliness or Risk for Loneliness  Goal: Demonstrate positive use of time alone when socialization is not possible  2/18/2022 1806 by Ailyn Navas RN  Outcome: Resolved/Met  2/18/2022 1450 by Serenity Watts SYLVESTER [place]  Outcome: Progressing Towards Goal     Problem: Fatigue  Goal: Verbalize increase energy and improved vitality  2/18/2022 1806 by June Crowley RN  Outcome: Resolved/Met  2/18/2022 1450 by June Crowley RN  Outcome: Progressing Towards Goal     Problem: Patient Education: Go to Patient Education Activity  Goal: Patient/Family Education  2/18/2022 1806 by June Crowley RN  Outcome: Resolved/Met  2/18/2022 1450 by June Crowley RN  Outcome: Progressing Towards Goal     Problem: Diabetes Self-Management  Goal: *Disease process and treatment process  Description: Define diabetes and identify own type of diabetes; list 3 options for treating diabetes. 2/18/2022 1806 by June Crowley RN  Outcome: Resolved/Met  2/18/2022 1450 by June Crowley RN  Outcome: Progressing Towards Goal  Goal: *Incorporating nutritional management into lifestyle  Description: Describe effect of type, amount and timing of food on blood glucose; list 3 methods for planning meals. 2/18/2022 1806 by June Crowley RN  Outcome: Resolved/Met  2/18/2022 1450 by June Crowley RN  Outcome: Progressing Towards Goal  Goal: *Incorporating physical activity into lifestyle  Description: State effect of exercise on blood glucose levels. 2/18/2022 1806 by June Crowley RN  Outcome: Resolved/Met  2/18/2022 1450 by June Crowley RN  Outcome: Progressing Towards Goal  Goal: *Developing strategies to promote health/change behavior  Description: Define the ABC's of diabetes; identify appropriate screenings, schedule and personal plan for screenings. 2/18/2022 1806 by June Crowley RN  Outcome: Resolved/Met  2/18/2022 1450 by June Crowley RN  Outcome: Progressing Towards Goal  Goal: *Using medications safely  Description: State effect of diabetes medications on diabetes; name diabetes medication taking, action and side effects.   2/18/2022 1806 by June Crowley RN  Outcome: Resolved/Met  2/18/2022 1450 by Elizabeth Briones SYLVESTER [place]  Outcome: Progressing Towards Goal  Goal: *Monitoring blood glucose, interpreting and using results  Description: Identify recommended blood glucose targets  and personal targets. 2/18/2022 1806 by Gene Kuhn RN  Outcome: Resolved/Met  2/18/2022 1450 by Gene Kuhn RN  Outcome: Progressing Towards Goal  Goal: *Prevention, detection, treatment of acute complications  Description: List symptoms of hyper- and hypoglycemia; describe how to treat low blood sugar and actions for lowering  high blood glucose level. 2/18/2022 1806 by Gene Kuhn RN  Outcome: Resolved/Met  2/18/2022 1450 by Gene Kuhn RN  Outcome: Progressing Towards Goal  Goal: *Prevention, detection and treatment of chronic complications  Description: Define the natural course of diabetes and describe the relationship of blood glucose levels to long term complications of diabetes.   2/18/2022 1806 by Gene Kuhn RN  Outcome: Resolved/Met  2/18/2022 1450 by Gene Kuhn RN  Outcome: Progressing Towards Goal  Goal: *Developing strategies to address psychosocial issues  Description: Describe feelings about living with diabetes; identify support needed and support network  2/18/2022 1806 by Gene Kuhn RN  Outcome: Resolved/Met  2/18/2022 1450 by Gene Kuhn RN  Outcome: Progressing Towards Goal  Goal: *Insulin pump training  2/18/2022 1806 by Gene Kuhn RN  Outcome: Resolved/Met  2/18/2022 1450 by Gene Kuhn RN  Outcome: Progressing Towards Goal  Goal: *Sick day guidelines  2/18/2022 1806 by Gene Kuhn RN  Outcome: Resolved/Met  2/18/2022 1450 by Gene Kuhn RN  Outcome: Progressing Towards Goal  Goal: *Patient Specific Goal (EDIT GOAL, INSERT TEXT)  2/18/2022 1806 by Gene Kuhn RN  Outcome: Resolved/Met  2/18/2022 1450 by Gene Kuhn RN  Outcome: Progressing Towards Goal     Problem: Patient Education: Go to Patient Education Activity  Goal: Patient/Family Education  2/18/2022 1806 by Spenser Canales RN  Outcome: Resolved/Met  2/18/2022 1450 by Spenser Canales RN  Outcome: Progressing Towards Goal     Problem:  Moderate Sedation (Adult)  Goal: *Patent airway  2/18/2022 1806 by Spenser Canales, RN  Outcome: Resolved/Met  2/18/2022 1450 by Spenser Canales RN  Outcome: Progressing Towards Goal  Goal: *Adequate oxygenation  2/18/2022 1806 by Spenser Canales, RN  Outcome: Resolved/Met  2/18/2022 1450 by Spenser Canales RN  Outcome: Progressing Towards Goal  Goal: *Absence of aspiration  2/18/2022 1806 by Spenser Canales, RN  Outcome: Resolved/Met  2/18/2022 1450 by Spenser Canales RN  Outcome: Progressing Towards Goal  Goal: *Hemodynamically stable  2/18/2022 1806 by Spenser Canales RN  Outcome: Resolved/Met  2/18/2022 1450 by Spenser Canales RN  Outcome: Progressing Towards Goal  Goal: *Optimal pain control at patient's stated goal  2/18/2022 1806 by Spenser Canales, RN  Outcome: Resolved/Met  2/18/2022 1450 by Spenser Canales RN  Outcome: Progressing Towards Goal  Goal: *Absence of nausea/vomiting  2/18/2022 1806 by Spenser Canales, RN  Outcome: Resolved/Met  2/18/2022 1450 by Spenser Canales RN  Outcome: Progressing Towards Goal  Goal: *Anxiety reduced or absent  2/18/2022 1806 by Spenser Canales, RN  Outcome: Resolved/Met  2/18/2022 1450 by Spenser Canales RN  Outcome: Progressing Towards Goal  Goal: *Absence of injury  2/18/2022 1806 by Spenser Canales, RN  Outcome: Resolved/Met  2/18/2022 1450 by Spenser Canales RN  Outcome: Progressing Towards Goal  Goal: *Level of consciousness returns to baseline  2/18/2022 1806 by Spenser Canales, RN  Outcome: Resolved/Met  2/18/2022 1450 by Spenser Canales RN  Outcome: Progressing Towards Goal  Goal: Interventions  2/18/2022 1806 by Spenser Canales RN  Outcome: Resolved/Met  2/18/2022 1450 by Spenser Canales RN  Outcome: Progressing Towards Goal     Problem: Patient Education: Go to Patient Education Activity  Goal: Patient/Family Education  2/18/2022 1806 by Bianca Britton RN  Outcome: Resolved/Met  2/18/2022 1450 by Bianca Britton RN  Outcome: Progressing Towards Goal     Problem: Patient Education: Go to Patient Education Activity  Goal: Patient/Family Education  2/18/2022 1806 by Bianca Britton, RN  Outcome: Resolved/Met  2/18/2022 1450 by Bianca Britton RN  Outcome: Progressing Towards Goal     Problem: Cath Lab Procedures: Pre-Procedure  Goal: Off Pathway (Use only if patient is Off Pathway)  2/18/2022 1806 by Bianca Britton RN  Outcome: Resolved/Met  2/18/2022 1450 by Bianca Britton RN  Outcome: Progressing Towards Goal  Goal: Activity/Safety  2/18/2022 1806 by Bianca Britton RN  Outcome: Resolved/Met  2/18/2022 1450 by Bianca Britton RN  Outcome: Progressing Towards Goal  Goal: Consults, if ordered  2/18/2022 1806 by Bianca Britton, RN  Outcome: Resolved/Met  2/18/2022 1450 by Bianca Britton RN  Outcome: Progressing Towards Goal  Goal: Diagnostic Test/Procedures  2/18/2022 1806 by Bianca Britton RN  Outcome: Resolved/Met  2/18/2022 1450 by Bianca Britton RN  Outcome: Progressing Towards Goal  Goal: Nutrition/Diet  2/18/2022 1806 by Bianca Britton RN  Outcome: Resolved/Met  2/18/2022 1450 by Bianca Britton RN  Outcome: Progressing Towards Goal  Goal: Discharge Planning  2/18/2022 1806 by Bianca Britton, RN  Outcome: Resolved/Met  2/18/2022 1450 by Bianca Britton RN  Outcome: Progressing Towards Goal  Goal: Medications  2/18/2022 1806 by Bianca Britton, RN  Outcome: Resolved/Met  2/18/2022 1450 by Bianca Britton RN  Outcome: Progressing Towards Goal  Goal: Respiratory  2/18/2022 1806 by Bianca Britton, RN  Outcome: Resolved/Met  2/18/2022 1450 by Bianca Britton RN  Outcome: Progressing Towards Goal  Goal: Treatments/Interventions/Procedures  2/18/2022 1806 by Bianac Britton, RN  Outcome: Resolved/Met  2/18/2022 1450 by Bianca Britton RN  Outcome: Progressing Towards Goal  Goal: Psychosocial  2/18/2022 1806 by Noah Hartmann RN  Outcome: Resolved/Met  2/18/2022 1450 by Noah Hartmann RN  Outcome: Progressing Towards Goal  Goal: *Verbalize description of procedure  2/18/2022 1806 by Noah Hartmann, RN  Outcome: Resolved/Met  2/18/2022 1450 by Noah Hartmann RN  Outcome: Progressing Towards Goal  Goal: *Consent signed  2/18/2022 1806 by Noah Hartmann, RN  Outcome: Resolved/Met  2/18/2022 1450 by Noah Hartmann RN  Outcome: Progressing Towards Goal     Problem: Cath Lab Procedures: Post-Cath Day of Procedure (Initiate SCIP Measures for Post-Op Care)  Goal: Off Pathway (Use only if patient is Off Pathway)  2/18/2022 1806 by Noah Hartmann, SYLVESTER  Outcome: Resolved/Met  2/18/2022 1450 by Noah Hartmann RN  Outcome: Progressing Towards Goal  Goal: Activity/Safety  2/18/2022 1806 by Noah Hartmann, RN  Outcome: Resolved/Met  2/18/2022 1450 by Noah Hartmann RN  Outcome: Progressing Towards Goal  Goal: Consults, if ordered  2/18/2022 1806 by Noah Hartmann, RN  Outcome: Resolved/Met  2/18/2022 1450 by Naoh Hartmann RN  Outcome: Progressing Towards Goal  Goal: Diagnostic Test/Procedures  2/18/2022 1806 by Noah Hartmann, RN  Outcome: Resolved/Met  2/18/2022 1450 by Noah Hartmann RN  Outcome: Progressing Towards Goal  Goal: Nutrition/Diet  2/18/2022 1806 by Noah Hartmann, RN  Outcome: Resolved/Met  2/18/2022 1450 by Noah Hartmann RN  Outcome: Progressing Towards Goal  Goal: Discharge Planning  2/18/2022 1806 by Noah Hartmann, RN  Outcome: Resolved/Met  2/18/2022 1450 by Noah Hartmann, RN  Outcome: Progressing Towards Goal  Goal: Medications  2/18/2022 1806 by Noah Hartmann, RN  Outcome: Resolved/Met  2/18/2022 1450 by Noah Hartmann, RN  Outcome: Progressing Towards Goal  Goal: Respiratory  2/18/2022 1806 by Noah Hartmann, RN  Outcome: Resolved/Met  2/18/2022 1450 by Noah Hartmann, RN  Outcome: Progressing Towards Goal  Goal: Treatments/Interventions/Procedures  2/18/2022 1806 by Newton Beauchamp RN  Outcome: Resolved/Met  2/18/2022 1450 by Newton Beauchamp RN  Outcome: Progressing Towards Goal  Goal: Psychosocial  2/18/2022 1806 by Newton Beauchamp, RN  Outcome: Resolved/Met  2/18/2022 1450 by Newton Beauchamp RN  Outcome: Progressing Towards Goal  Goal: *Procedure site is without bleeding and signs of infection six hours post sheath removal  2/18/2022 1806 by Newton Beauchamp, RN  Outcome: Resolved/Met  2/18/2022 1450 by Newton Beauchamp RN  Outcome: Progressing Towards Goal  Goal: *Hemodynamically stable  2/18/2022 1806 by Newton Beauchamp RN  Outcome: Resolved/Met  2/18/2022 1450 by Newton Beauchamp RN  Outcome: Progressing Towards Goal  Goal: *Optimal pain control at patient's stated goal  2/18/2022 1806 by Newton Beauchamp, RN  Outcome: Resolved/Met  2/18/2022 1450 by Newton Beauchamp RN  Outcome: Progressing Towards Goal     Problem: Cath Lab Procedures: Post-Cath Day 1  Goal: Off Pathway (Use only if patient is Off Pathway)  2/18/2022 1806 by Newton Beauchamp RN  Outcome: Resolved/Met  2/18/2022 1450 by Newton Beauchamp RN  Outcome: Progressing Towards Goal  Goal: Activity/Safety  2/18/2022 1806 by Newton Beauchamp RN  Outcome: Resolved/Met  2/18/2022 1450 by Newton Beauchamp RN  Outcome: Progressing Towards Goal  Goal: Diagnostic Test/Procedures  2/18/2022 1806 by Newton Beauchamp, RN  Outcome: Resolved/Met  2/18/2022 1450 by Newton Beauchamp RN  Outcome: Progressing Towards Goal  Goal: Nutrition/Diet  2/18/2022 1806 by Newton Beauchamp, RN  Outcome: Resolved/Met  2/18/2022 1450 by Newton Beauchamp RN  Outcome: Progressing Towards Goal  Goal: Discharge Planning  2/18/2022 1806 by Newton Beauchamp, RN  Outcome: Resolved/Met  2/18/2022 1450 by Newton Beauchamp RN  Outcome: Progressing Towards Goal  Goal: Medications  2/18/2022 1806 by Newton Beauchamp, RN  Outcome: Resolved/Met  2/18/2022 1450 by Newton Beauchamp RN  Outcome: Progressing Towards Goal  Goal: Respiratory  2/18/2022 1806 by Jacki Camargo RN  Outcome: Resolved/Met  2/18/2022 1450 by Jacki Camargo RN  Outcome: Progressing Towards Goal  Goal: Treatments/Interventions/Procedures  2/18/2022 1806 by Jacki Camargo RN  Outcome: Resolved/Met  2/18/2022 1450 by Jacki Camargo RN  Outcome: Progressing Towards Goal  Goal: Psychosocial  2/18/2022 1806 by Jacki Camargo RN  Outcome: Resolved/Met  2/18/2022 1450 by Jacki Camargo RN  Outcome: Progressing Towards Goal     Problem: Cath Lab Procedures: Discharge Outcomes  Goal: *Stable cardiac rhythm  2/18/2022 1806 by Jacki aCmargo RN  Outcome: Resolved/Met  2/18/2022 1450 by Jacki Camargo RN  Outcome: Progressing Towards Goal  Goal: *Hemodynamically stable  2/18/2022 1806 by Jacki Camargo RN  Outcome: Resolved/Met  2/18/2022 1450 by Jacki Camargo RN  Outcome: Progressing Towards Goal  Goal: *Optimal pain control at patient's stated goal  2/18/2022 1806 by Jacki Camargo RN  Outcome: Resolved/Met  2/18/2022 1450 by Jacki Camargo RN  Outcome: Progressing Towards Goal  Goal: *Pulses palpable, skin color within defined limits, skin temperature warm  2/18/2022 1806 by Jacki Camargo RN  Outcome: Resolved/Met  2/18/2022 1450 by Jacki Camargo RN  Outcome: Progressing Towards Goal  Goal: *Lungs clear or at baseline  2/18/2022 1806 by Jacki Camargo RN  Outcome: Resolved/Met  2/18/2022 1450 by Jacki Camargo RN  Outcome: Progressing Towards Goal  Goal: *Demonstrates ability to perform prescribed activity without shortness of breath or discomfort  2/18/2022 1806 by Jacki Camargo RN  Outcome: Resolved/Met  2/18/2022 1450 by Jacki Camargo RN  Outcome: Progressing Towards Goal  Goal: *Verbalizes home exercise program, activity guidelines, cardiac precautions  2/18/2022 1806 by Jacki Camargo, SYLVESTER  Outcome: Resolved/Met  2/18/2022 1450 by Jacki Camargo RN  Outcome: Progressing Towards Goal  Goal: Hailey Valentino understanding and describes prescribed diet  2/18/2022 1806 by Vianca Hollins RN  Outcome: Resolved/Met  2/18/2022 1450 by Vianca Hollins RN  Outcome: Progressing Towards Goal  Goal: *Verbalizes understanding and describes medication purposes and frequencies  2/18/2022 1806 by Vianca Hollins RN  Outcome: Resolved/Met  2/18/2022 1450 by Vianca Hollins RN  Outcome: Progressing Towards Goal  Goal: *Identifies cardiac risk factors  2/18/2022 1806 by Vianca Hollins RN  Outcome: Resolved/Met  2/18/2022 1450 by Vianca Hollins RN  Outcome: Progressing Towards Goal  Goal: *No signs and symptoms of infection or wound complications  0/47/3644 1806 by Vianca Hollins RN  Outcome: Resolved/Met  2/18/2022 1450 by Vianca Hollins RN  Outcome: Progressing Towards Goal  Goal: *Anxiety reduced or absent  2/18/2022 1806 by Vianca Hollins RN  Outcome: Resolved/Met  2/18/2022 1450 by Vianca Hollins RN  Outcome: Progressing Towards Goal  Goal: *Verbalizes and demonstrates incision care  2/18/2022 1806 by Vianca Hollins RN  Outcome: Resolved/Met  2/18/2022 1450 by Vianca Hollins RN  Outcome: Progressing Towards Goal  Goal: *Understands and describes signs and symptoms to report to providers(Stroke Metric)  2/18/2022 1806 by Vianca Hollins RN  Outcome: Resolved/Met  2/18/2022 1450 by Vianca Hollins RN  Outcome: Progressing Towards Goal  Goal: *Describes follow-up/return visits to physicians  2/18/2022 1806 by Vianca Hollins RN  Outcome: Resolved/Met  2/18/2022 1450 by Vianca Hollins RN  Outcome: Progressing Towards Goal  Goal: *Describes available resources and support systems  2/18/2022 1806 by Vianca Hollins RN  Outcome: Resolved/Met  2/18/2022 1450 by Vianca Hollins RN  Outcome: Progressing Towards Goal  Goal: *Influenza immunization  2/18/2022 1806 by Vianca Hollins RN  Outcome: Resolved/Met  2/18/2022 1450 by Vianca Hollins RN  Outcome: Progressing Towards Goal  Goal: *Pneumococcal immunization  2/18/2022 1806 by Norberto Pritchard, RN  Outcome: Resolved/Met  2/18/2022 1450 by Norberto Pritchard, RN  Outcome: Progressing Towards Goal

## 2022-02-19 LAB
BACTERIA SPEC CULT: NORMAL
BACTERIA SPEC CULT: NORMAL
SERVICE CMNT-IMP: NORMAL
SERVICE CMNT-IMP: NORMAL

## 2022-02-21 ENCOUNTER — PATIENT OUTREACH (OUTPATIENT)
Dept: CASE MANAGEMENT | Age: 57
End: 2022-02-21

## 2022-02-21 NOTE — PROGRESS NOTES
Transitions of Care Call    Call within 2 business days of discharge: Yes     Patient: Shyanne Drummond Patient : 1965 MRN: [id number]    Last Discharge [address]       Complaint Diagnosis Description Type Department Provider    22 Shortness of Breath Non-ST elevated myocardial infarction [facility]) . .. ED to Hosp-Admission (Discharged) (ADMIT) Mitzi Ledesma MD; Ramakrishna aLng, ... Was this an external facility discharge? No Discharge Facility: n/a    Challenges to be reviewed by the provider   Additional needs identified to be addressed with provider no  none           Encounter was not routed to provider for escalation. Method of communication with provider: none. Spoke with Pt. On phone and Pt. Verified his identity. Patient reports that he is doing well. No new or worsening of symptoms reported by Pt. At this time. Pt. Reports that he has an upcoming PCP apt. this 22. No questions, concerns nad/or needs at this time as per Pt. Current Symptoms:no new symptoms and no worsening symptoms    Reviewed New or Changed Meds: yes    Do you have what you need at home?  Durable Medical Equipment ordered at discharge: None   Home Health/Outpatient orders at discharge: none   Pulse oximeter? no     Patient education provided: Reviewed appropriate site of care based on symptoms and resources available to patient including: PCP and When to call 911. Follow up appointment scheduled within 7 days of discharge: yes. If no appointment scheduled, scheduling offered: no.  Future Appointments   Date Time Provider Viviane Lebron   2022  1:30 PM La Nena Martin MD [facility]       Interventions: closely follow up with PCP, take all medications as prescribed. call PCP office for any questions, concerns and/or needs. CTN reviewed discharge instructions, medical action plan and red flags with patient who verbalized understanding.     Provided contact information for future needs. Plan for follow-up call in 7-10 days based on severity of symptoms and risk factors. Plan for next call: PCP apt. attendance and assess for any needs.       Clau Carrizales RN

## 2022-02-23 ENCOUNTER — APPOINTMENT (OUTPATIENT)
Dept: WOUND CARE | Age: 57
End: 2022-02-23
Attending: HOSPITALIST

## 2022-02-25 ENCOUNTER — APPOINTMENT (OUTPATIENT)
Dept: WOUND CARE | Age: 57
End: 2022-02-25
Attending: PODIATRIST

## 2022-02-28 ENCOUNTER — PATIENT OUTREACH (OUTPATIENT)
Dept: CASE MANAGEMENT | Age: 57
End: 2022-02-28

## 2022-02-28 NOTE — PROGRESS NOTES
Care Transitions Follow Up Call    Challenges to be reviewed by the provider   Additional needs identified to be addressed with provider: no  none           Method of communication with provider : none    Care Transition Nurse (CTN) contacted the patient by telephone to follow up after admission on 22. Verified name and  with patient as identifiers. Patient reports that he is doing good. Patient states that his symptoms are better. Pt. Reports that he attended PCP apt. Pt. Reports that he is going to [facility] next Tuesday for apt. No questions, concerns and/or needs verbalized by Pt. At this time. Follow up appointment completed? Yes as per Pt. Was follow up appointment scheduled within 7 days of discharge? yes. Pt. Declined future follow up calls from CTN. This episode is closed and resolved.

## (undated) DEVICE — SPLINT WR POS F/ARTERIAL ACC -- BX/10

## (undated) DEVICE — Device

## (undated) DEVICE — PROCEDURE KIT FLUID MGMT 10 FR CUST MAINFOLD

## (undated) DEVICE — TUBING PRSS MON L24IN PVC RIG NONEXPANDING M TO FEM CONN

## (undated) DEVICE — STOPCOCK TRNSDUC 500PSI 3 W ROT M LUER LT BLU OFF HNDL R

## (undated) DEVICE — GLIDESHEATH SLENDER STAINLESS STEEL KIT: Brand: GLIDESHEATH SLENDER

## (undated) DEVICE — PRESSURE MONITORING SET: Brand: TRUWAVE

## (undated) DEVICE — DRAPE,ANGIO,BRACH,STERILE,38X44: Brand: MEDLINE

## (undated) DEVICE — GUIDEWIRE VASC L260CM DIA0.035IN RAD 3MM J TIP L7CM PTFE

## (undated) DEVICE — SENSOR PLSE OXMTR AD CBL L36IN ADH FRM FIT SPO2 DISP

## (undated) DEVICE — ANGIOGRAPHIC CATHETER: Brand: EXPO™

## (undated) DEVICE — PACK PROCEDURE SURG CATH CUST